# Patient Record
Sex: MALE | Race: OTHER | NOT HISPANIC OR LATINO
[De-identification: names, ages, dates, MRNs, and addresses within clinical notes are randomized per-mention and may not be internally consistent; named-entity substitution may affect disease eponyms.]

---

## 2017-01-19 ENCOUNTER — NON-APPOINTMENT (OUTPATIENT)
Age: 82
End: 2017-01-19

## 2017-01-19 ENCOUNTER — APPOINTMENT (OUTPATIENT)
Dept: ELECTROPHYSIOLOGY | Facility: CLINIC | Age: 82
End: 2017-01-19

## 2017-01-19 VITALS
HEIGHT: 62 IN | HEART RATE: 141 BPM | BODY MASS INDEX: 21.9 KG/M2 | SYSTOLIC BLOOD PRESSURE: 174 MMHG | DIASTOLIC BLOOD PRESSURE: 122 MMHG | WEIGHT: 119 LBS | RESPIRATION RATE: 14 BRPM | OXYGEN SATURATION: 100 %

## 2017-01-19 DIAGNOSIS — I10 ESSENTIAL (PRIMARY) HYPERTENSION: ICD-10-CM

## 2017-01-26 ENCOUNTER — APPOINTMENT (OUTPATIENT)
Dept: ELECTROPHYSIOLOGY | Facility: CLINIC | Age: 82
End: 2017-01-26

## 2017-01-26 VITALS — SYSTOLIC BLOOD PRESSURE: 179 MMHG | DIASTOLIC BLOOD PRESSURE: 78 MMHG | HEART RATE: 67 BPM

## 2017-01-26 VITALS — WEIGHT: 119 LBS | BODY MASS INDEX: 21.9 KG/M2 | HEIGHT: 62 IN

## 2017-01-26 DIAGNOSIS — I35.0 NONRHEUMATIC AORTIC (VALVE) STENOSIS: ICD-10-CM

## 2017-01-27 PROBLEM — I35.0 NONRHEUMATIC AORTIC VALVE STENOSIS: Status: ACTIVE | Noted: 2017-01-27

## 2017-01-27 RX ORDER — METOPROLOL TARTRATE 75 MG/1
75 TABLET, FILM COATED ORAL
Refills: 0 | Status: ACTIVE | COMMUNITY
Start: 2017-01-27

## 2017-01-27 RX ORDER — DILTIAZEM HYDROCHLORIDE 300 MG/1
300 CAPSULE, EXTENDED RELEASE ORAL
Refills: 0 | Status: ACTIVE | COMMUNITY
Start: 2017-01-27

## 2017-01-30 ENCOUNTER — APPOINTMENT (OUTPATIENT)
Dept: ENDOCRINOLOGY | Facility: CLINIC | Age: 82
End: 2017-01-30

## 2017-01-30 VITALS
DIASTOLIC BLOOD PRESSURE: 90 MMHG | HEART RATE: 72 BPM | SYSTOLIC BLOOD PRESSURE: 150 MMHG | BODY MASS INDEX: 22.08 KG/M2 | HEIGHT: 62 IN | WEIGHT: 120 LBS | OXYGEN SATURATION: 94 %

## 2017-01-30 DIAGNOSIS — E11.9 TYPE 2 DIABETES MELLITUS W/OUT COMPLICATIONS: ICD-10-CM

## 2017-01-30 DIAGNOSIS — R94.6 ABNORMAL RESULTS OF THYROID FUNCTION STUDIES: ICD-10-CM

## 2017-01-30 LAB
GLUCOSE BLDC GLUCOMTR-MCNC: 208
HBA1C MFR BLD HPLC: 6.9

## 2017-01-30 RX ORDER — WARFARIN SODIUM 10 MG/1
10 TABLET ORAL
Qty: 30 | Refills: 0 | Status: ACTIVE | COMMUNITY
Start: 2016-11-17

## 2017-01-30 RX ORDER — LANCETS 33 GAUGE
EACH MISCELLANEOUS
Qty: 1 | Refills: 3 | Status: ACTIVE | COMMUNITY
Start: 2017-01-30

## 2017-01-30 RX ORDER — BLOOD SUGAR DIAGNOSTIC
STRIP MISCELLANEOUS
Qty: 1 | Refills: 1 | Status: ACTIVE | COMMUNITY
Start: 2017-01-30

## 2017-03-27 ENCOUNTER — APPOINTMENT (OUTPATIENT)
Dept: ELECTROPHYSIOLOGY | Facility: CLINIC | Age: 82
End: 2017-03-27

## 2017-03-27 DIAGNOSIS — R00.2 PALPITATIONS: ICD-10-CM

## 2017-04-06 ENCOUNTER — APPOINTMENT (OUTPATIENT)
Dept: ENDOCRINOLOGY | Facility: CLINIC | Age: 82
End: 2017-04-06

## 2017-06-29 ENCOUNTER — APPOINTMENT (OUTPATIENT)
Dept: ELECTROPHYSIOLOGY | Facility: CLINIC | Age: 82
End: 2017-06-29

## 2017-10-05 ENCOUNTER — APPOINTMENT (OUTPATIENT)
Dept: ELECTROPHYSIOLOGY | Facility: CLINIC | Age: 82
End: 2017-10-05
Payer: MEDICARE

## 2017-10-05 DIAGNOSIS — I48.0 PAROXYSMAL ATRIAL FIBRILLATION: ICD-10-CM

## 2017-10-05 PROCEDURE — 93296 REM INTERROG EVL PM/IDS: CPT

## 2017-10-05 PROCEDURE — 93294 REM INTERROG EVL PM/LDLS PM: CPT

## 2018-04-26 ENCOUNTER — APPOINTMENT (OUTPATIENT)
Dept: ELECTROPHYSIOLOGY | Facility: CLINIC | Age: 83
End: 2018-04-26

## 2019-11-29 ENCOUNTER — APPOINTMENT (OUTPATIENT)
Dept: ELECTROPHYSIOLOGY | Facility: CLINIC | Age: 84
End: 2019-11-29
Payer: MEDICARE

## 2019-11-29 PROCEDURE — 93296 REM INTERROG EVL PM/IDS: CPT

## 2019-11-29 PROCEDURE — 93294 REM INTERROG EVL PM/LDLS PM: CPT

## 2019-12-14 ENCOUNTER — EMERGENCY (EMERGENCY)
Facility: HOSPITAL | Age: 84
LOS: 0 days | Discharge: LEFT BEFORE DISCHARGE | End: 2019-12-14
Attending: EMERGENCY MEDICINE
Payer: MEDICARE

## 2019-12-14 VITALS
SYSTOLIC BLOOD PRESSURE: 135 MMHG | DIASTOLIC BLOOD PRESSURE: 75 MMHG | HEART RATE: 80 BPM | TEMPERATURE: 98 F | OXYGEN SATURATION: 100 % | RESPIRATION RATE: 18 BRPM

## 2019-12-14 VITALS
HEART RATE: 87 BPM | WEIGHT: 108.91 LBS | RESPIRATION RATE: 16 BRPM | TEMPERATURE: 98 F | OXYGEN SATURATION: 98 % | DIASTOLIC BLOOD PRESSURE: 77 MMHG | HEIGHT: 61 IN | SYSTOLIC BLOOD PRESSURE: 130 MMHG

## 2019-12-14 DIAGNOSIS — Z98.49 CATARACT EXTRACTION STATUS, UNSPECIFIED EYE: Chronic | ICD-10-CM

## 2019-12-14 DIAGNOSIS — Z95.0 PRESENCE OF CARDIAC PACEMAKER: Chronic | ICD-10-CM

## 2019-12-14 DIAGNOSIS — R53.1 WEAKNESS: ICD-10-CM

## 2019-12-14 DIAGNOSIS — I63.9 CEREBRAL INFARCTION, UNSPECIFIED: ICD-10-CM

## 2019-12-14 LAB
ALBUMIN SERPL ELPH-MCNC: 3.7 G/DL — SIGNIFICANT CHANGE UP (ref 3.3–5)
ALP SERPL-CCNC: 58 U/L — SIGNIFICANT CHANGE UP (ref 40–120)
ALT FLD-CCNC: 28 U/L — SIGNIFICANT CHANGE UP (ref 12–78)
ANION GAP SERPL CALC-SCNC: 7 MMOL/L — SIGNIFICANT CHANGE UP (ref 5–17)
APTT BLD: 35.7 SEC — SIGNIFICANT CHANGE UP (ref 28.5–37)
AST SERPL-CCNC: 20 U/L — SIGNIFICANT CHANGE UP (ref 15–37)
BASOPHILS # BLD AUTO: 0.09 K/UL — SIGNIFICANT CHANGE UP (ref 0–0.2)
BASOPHILS NFR BLD AUTO: 0.7 % — SIGNIFICANT CHANGE UP (ref 0–2)
BILIRUB SERPL-MCNC: 0.4 MG/DL — SIGNIFICANT CHANGE UP (ref 0.2–1.2)
BLD GP AB SCN SERPL QL: SIGNIFICANT CHANGE UP
BUN SERPL-MCNC: 32 MG/DL — HIGH (ref 7–23)
CALCIUM SERPL-MCNC: 8.7 MG/DL — SIGNIFICANT CHANGE UP (ref 8.5–10.1)
CHLORIDE SERPL-SCNC: 96 MMOL/L — SIGNIFICANT CHANGE UP (ref 96–108)
CK MB CFR SERPL CALC: 3.5 NG/ML — SIGNIFICANT CHANGE UP (ref 0.5–3.6)
CO2 SERPL-SCNC: 29 MMOL/L — SIGNIFICANT CHANGE UP (ref 22–31)
CREAT SERPL-MCNC: 1.58 MG/DL — HIGH (ref 0.5–1.3)
EOSINOPHIL # BLD AUTO: 0.51 K/UL — HIGH (ref 0–0.5)
EOSINOPHIL NFR BLD AUTO: 4.2 % — SIGNIFICANT CHANGE UP (ref 0–6)
GLUCOSE BLDC GLUCOMTR-MCNC: 214 MG/DL — HIGH (ref 70–99)
GLUCOSE SERPL-MCNC: 215 MG/DL — HIGH (ref 70–99)
HCT VFR BLD CALC: 36 % — LOW (ref 39–50)
HGB BLD-MCNC: 12.3 G/DL — LOW (ref 13–17)
IMM GRANULOCYTES NFR BLD AUTO: 0.9 % — SIGNIFICANT CHANGE UP (ref 0–1.5)
INR BLD: 1.83 RATIO — HIGH (ref 0.88–1.16)
LYMPHOCYTES # BLD AUTO: 2.67 K/UL — SIGNIFICANT CHANGE UP (ref 1–3.3)
LYMPHOCYTES # BLD AUTO: 21.8 % — SIGNIFICANT CHANGE UP (ref 13–44)
MAGNESIUM SERPL-MCNC: 2.1 MG/DL — SIGNIFICANT CHANGE UP (ref 1.6–2.6)
MCHC RBC-ENTMCNC: 31.1 PG — SIGNIFICANT CHANGE UP (ref 27–34)
MCHC RBC-ENTMCNC: 34.2 GM/DL — SIGNIFICANT CHANGE UP (ref 32–36)
MCV RBC AUTO: 91.1 FL — SIGNIFICANT CHANGE UP (ref 80–100)
MONOCYTES # BLD AUTO: 1.15 K/UL — HIGH (ref 0–0.9)
MONOCYTES NFR BLD AUTO: 9.4 % — SIGNIFICANT CHANGE UP (ref 2–14)
NEUTROPHILS # BLD AUTO: 7.72 K/UL — HIGH (ref 1.8–7.4)
NEUTROPHILS NFR BLD AUTO: 63 % — SIGNIFICANT CHANGE UP (ref 43–77)
NRBC # BLD: 0 /100 WBCS — SIGNIFICANT CHANGE UP (ref 0–0)
NT-PROBNP SERPL-SCNC: 4606 PG/ML — HIGH (ref 0–450)
PLATELET # BLD AUTO: 204 K/UL — SIGNIFICANT CHANGE UP (ref 150–400)
POTASSIUM SERPL-MCNC: 4.2 MMOL/L — SIGNIFICANT CHANGE UP (ref 3.5–5.3)
POTASSIUM SERPL-SCNC: 4.2 MMOL/L — SIGNIFICANT CHANGE UP (ref 3.5–5.3)
PROT SERPL-MCNC: 7.5 GM/DL — SIGNIFICANT CHANGE UP (ref 6–8.3)
PROTHROM AB SERPL-ACNC: 20.9 SEC — HIGH (ref 10–12.9)
RBC # BLD: 3.95 M/UL — LOW (ref 4.2–5.8)
RBC # FLD: 12.2 % — SIGNIFICANT CHANGE UP (ref 10.3–14.5)
SODIUM SERPL-SCNC: 132 MMOL/L — LOW (ref 135–145)
TROPONIN I SERPL-MCNC: <.015 NG/ML — SIGNIFICANT CHANGE UP (ref 0.01–0.04)
TSH SERPL-MCNC: 3.91 UU/ML — HIGH (ref 0.36–3.74)
WBC # BLD: 12.25 K/UL — HIGH (ref 3.8–10.5)
WBC # FLD AUTO: 12.25 K/UL — HIGH (ref 3.8–10.5)

## 2019-12-14 PROCEDURE — 93010 ELECTROCARDIOGRAM REPORT: CPT

## 2019-12-14 PROCEDURE — 99284 EMERGENCY DEPT VISIT MOD MDM: CPT

## 2019-12-14 PROCEDURE — 70450 CT HEAD/BRAIN W/O DYE: CPT | Mod: 26

## 2019-12-14 RX ORDER — ASPIRIN/CALCIUM CARB/MAGNESIUM 324 MG
325 TABLET ORAL ONCE
Refills: 0 | Status: COMPLETED | OUTPATIENT
Start: 2019-12-14 | End: 2019-12-14

## 2019-12-14 RX ADMIN — Medication 325 MILLIGRAM(S): at 11:53

## 2019-12-14 NOTE — ED PROVIDER NOTE - NSFOLLOWUPINSTRUCTIONS_ED_ALL_ED_FT
You are leaving the hospital against medical advise after having had a stroke.     Please follow up with neurology or an emergency room as soon as possible.

## 2019-12-14 NOTE — ED ADULT TRIAGE NOTE - CHIEF COMPLAINT QUOTE
since about 1030  noted left facial droop possible onset last night family noticed at 1030 left side completely immobile Dr Coates evaluated and cleared for CT out of window

## 2019-12-14 NOTE — ED ADULT NURSE NOTE - OBJECTIVE STATEMENT
received pt to bed 8 awake alert and oriented to person and place. noted with left side facial droop, weakness and decreased sensation to left side of body. unknown last normal. possible before bed last night. pt was sent to CT stat. labs obtained. pt place on  Cm. afib on monitor. family at bedside. requesting pt be transferred to [place]. dr mccullough made aware. plan of car explained to pt 's daughter who verbalized understanding.

## 2019-12-14 NOTE — ED PROVIDER NOTE - CLINICAL SUMMARY MEDICAL DECISION MAKING FREE TEXT BOX
pt pw cva. not tpa candidate based on use of coumadin. will treat medically. I read ekg as afib rate 92, st depressions laterally, no st elevation, normal qtc, narrow qrs. pt pw cva. not tpa candidate based on use of coumadin. will treat medically. I read ekg as afib rate 92, st depressions laterally, no st elevation, normal qtc, narrow qrs. ct head no acute process. I offered and attempted to admit the patient and even discussed case with neuro dr cervantes, however pt and fam insist on leaving [facility]. they want to follow up with their primary care doctor. I explained risk of leaving [facility] including another stroke and death. pt and fam understand this and want to accept risk. father may or may not have full capacity (oriented x3 but cognitive slowing) but his son and daughter do. will honor their decision.

## 2019-12-14 NOTE — ED PROVIDER NOTE - PHYSICAL EXAMINATION
Gen: Alert, NAD  Head: NC, AT   Eyes: PERRL, EOMI, normal lids/conjunctiva  ENT: normal hearing, patent oropharynx without erythema/exudate, uvula midline  Neck: supple, no tenderness, Trachea midline  Pulm: Bilateral BS, normal resp effort, no wheeze/stridor/retractions  CV: irregular, left chest pacemaker  Abd: soft, NT/ND, +BS, no hepatosplenomegaly  Mskel: extremities x4 with normal ROM and no joint effusions. no ctl spine ttp.   Skin: no rash, no bruising   Neuro: AAOx3 but some cognitive slowing, left side weakness 4/5. right side 5/5. finger to nose intact. decrease sensation lle.

## 2019-12-14 NOTE — ED PROVIDER NOTE - PATIENT PORTAL LINK FT
You can access the FollowMyHealth Patient Portal offered by [facility] by registering at the following website: [email or web address]. By joining [employer]’s FollowMyHealth portal, you will also be able to view your health information using other applications (apps) compatible with our system.

## 2019-12-14 NOTE — ED PROVIDER NOTE - OBJECTIVE STATEMENT
Pertinent PMH/PSH/FHx/SHx and Review of Systems contained within:  89M hx of afib on coumadin and htn pw weakness. at approx 1010am pt was known to be well and in usoh. shortly thereafter, perhaps 1020am pt started drooling and was found to be weak. he doesn't complain of anything in particular. denies ha, vision loss, cp, sob, nausea, vomiting, fever, chills, rash, bleeding, rhinorrhea, dysuria. brought over by ems  Fh and Sh not otherwise contributory  ROS otherwise negative

## 2019-12-14 NOTE — ED ADULT NURSE NOTE - NSIMPLEMENTINTERV_GEN_ALL_ED
Implemented All Fall with Harm Risk Interventions:  [place] to call system. Call bell, personal items and telephone within reach. Instruct patient to call for assistance. Room bathroom lighting operational. Non-slip footwear when patient is off stretcher. Physically safe environment: no spills, clutter or unnecessary equipment. Stretcher in lowest position, wheels locked, appropriate side rails in place. Provide visual cue, wrist band, yellow gown, etc. Monitor gait and stability. Monitor for mental status changes and reorient to person, place, and time. Review medications for side effects contributing to fall risk. Reinforce activity limits and safety measures with patient and family. Provide visual clues: red socks.

## 2019-12-14 NOTE — ED PROVIDER NOTE - CARE PROVIDER_API CALL
Juanita Syed (DO)  Neurology  [address] [place] [address]  Phone: [phone]  Fax: [phone]  Follow Up Time:

## 2019-12-14 NOTE — ED ADULT NURSE REASSESSMENT NOTE - NS ED NURSE REASSESS COMMENT FT1
pt discharged AMA. family wants pt treated at [place]. vital signs stable. heplock discontinued. discharge instructions explained to daughter who verbalized understanding. pt and daughter escorted to waiting area.

## 2020-02-18 PROBLEM — Z00.00 ENCOUNTER FOR PREVENTIVE HEALTH EXAMINATION: Noted: 2020-02-18

## 2020-02-20 ENCOUNTER — INPATIENT (INPATIENT)
Facility: HOSPITAL | Age: 85
LOS: 2 days | Discharge: HOME HEALTH | End: 2020-02-23
Attending: INTERNAL MEDICINE | Admitting: INTERNAL MEDICINE
Payer: MEDICARE

## 2020-02-20 VITALS
HEIGHT: 60 IN | DIASTOLIC BLOOD PRESSURE: 56 MMHG | RESPIRATION RATE: 18 BRPM | TEMPERATURE: 98 F | HEART RATE: 60 BPM | OXYGEN SATURATION: 99 % | SYSTOLIC BLOOD PRESSURE: 144 MMHG | WEIGHT: 113.1 LBS

## 2020-02-20 DIAGNOSIS — I10 ESSENTIAL (PRIMARY) HYPERTENSION: ICD-10-CM

## 2020-02-20 DIAGNOSIS — Z95.0 PRESENCE OF CARDIAC PACEMAKER: Chronic | ICD-10-CM

## 2020-02-20 DIAGNOSIS — I63.9 CEREBRAL INFARCTION, UNSPECIFIED: ICD-10-CM

## 2020-02-20 DIAGNOSIS — Z29.9 ENCOUNTER FOR PROPHYLACTIC MEASURES, UNSPECIFIED: ICD-10-CM

## 2020-02-20 DIAGNOSIS — E11.9 TYPE 2 DIABETES MELLITUS WITHOUT COMPLICATIONS: ICD-10-CM

## 2020-02-20 DIAGNOSIS — I48.91 UNSPECIFIED ATRIAL FIBRILLATION: ICD-10-CM

## 2020-02-20 DIAGNOSIS — Z98.49 CATARACT EXTRACTION STATUS, UNSPECIFIED EYE: Chronic | ICD-10-CM

## 2020-02-20 LAB
ALBUMIN SERPL ELPH-MCNC: 2.9 G/DL — LOW (ref 3.3–5)
ALP SERPL-CCNC: 65 U/L — SIGNIFICANT CHANGE UP (ref 40–120)
ALT FLD-CCNC: 28 U/L — SIGNIFICANT CHANGE UP (ref 12–78)
ANION GAP SERPL CALC-SCNC: 7 MMOL/L — SIGNIFICANT CHANGE UP (ref 5–17)
APPEARANCE UR: CLEAR — SIGNIFICANT CHANGE UP
APTT BLD: 34.1 SEC — SIGNIFICANT CHANGE UP (ref 28.5–37)
AST SERPL-CCNC: 18 U/L — SIGNIFICANT CHANGE UP (ref 15–37)
BASOPHILS # BLD AUTO: 0.05 K/UL — SIGNIFICANT CHANGE UP (ref 0–0.2)
BASOPHILS NFR BLD AUTO: 0.5 % — SIGNIFICANT CHANGE UP (ref 0–2)
BILIRUB SERPL-MCNC: 0.5 MG/DL — SIGNIFICANT CHANGE UP (ref 0.2–1.2)
BILIRUB UR-MCNC: NEGATIVE — SIGNIFICANT CHANGE UP
BLD GP AB SCN SERPL QL: SIGNIFICANT CHANGE UP
BUN SERPL-MCNC: 37 MG/DL — HIGH (ref 7–23)
CALCIUM SERPL-MCNC: 8.1 MG/DL — LOW (ref 8.5–10.1)
CHLORIDE SERPL-SCNC: 102 MMOL/L — SIGNIFICANT CHANGE UP (ref 96–108)
CO2 SERPL-SCNC: 27 MMOL/L — SIGNIFICANT CHANGE UP (ref 22–31)
COLOR SPEC: YELLOW — SIGNIFICANT CHANGE UP
CREAT SERPL-MCNC: 1.67 MG/DL — HIGH (ref 0.5–1.3)
DIFF PNL FLD: ABNORMAL
EOSINOPHIL # BLD AUTO: 0.18 K/UL — SIGNIFICANT CHANGE UP (ref 0–0.5)
EOSINOPHIL NFR BLD AUTO: 1.8 % — SIGNIFICANT CHANGE UP (ref 0–6)
EPI CELLS # UR: SIGNIFICANT CHANGE UP
GLUCOSE BLDC GLUCOMTR-MCNC: 185 MG/DL — HIGH (ref 70–99)
GLUCOSE BLDC GLUCOMTR-MCNC: 223 MG/DL — HIGH (ref 70–99)
GLUCOSE BLDC GLUCOMTR-MCNC: 299 MG/DL — HIGH (ref 70–99)
GLUCOSE SERPL-MCNC: 278 MG/DL — HIGH (ref 70–99)
GLUCOSE UR QL: 100 MG/DL
HCT VFR BLD CALC: 32.5 % — LOW (ref 39–50)
HGB BLD-MCNC: 11 G/DL — LOW (ref 13–17)
IMM GRANULOCYTES NFR BLD AUTO: 1 % — SIGNIFICANT CHANGE UP (ref 0–1.5)
INR BLD: 1.39 RATIO — HIGH (ref 0.88–1.16)
KETONES UR-MCNC: NEGATIVE — SIGNIFICANT CHANGE UP
LEUKOCYTE ESTERASE UR-ACNC: NEGATIVE — SIGNIFICANT CHANGE UP
LYMPHOCYTES # BLD AUTO: 2.65 K/UL — SIGNIFICANT CHANGE UP (ref 1–3.3)
LYMPHOCYTES # BLD AUTO: 26.7 % — SIGNIFICANT CHANGE UP (ref 13–44)
MCHC RBC-ENTMCNC: 31.3 PG — SIGNIFICANT CHANGE UP (ref 27–34)
MCHC RBC-ENTMCNC: 33.8 GM/DL — SIGNIFICANT CHANGE UP (ref 32–36)
MCV RBC AUTO: 92.3 FL — SIGNIFICANT CHANGE UP (ref 80–100)
MONOCYTES # BLD AUTO: 1.15 K/UL — HIGH (ref 0–0.9)
MONOCYTES NFR BLD AUTO: 11.6 % — SIGNIFICANT CHANGE UP (ref 2–14)
NEUTROPHILS # BLD AUTO: 5.81 K/UL — SIGNIFICANT CHANGE UP (ref 1.8–7.4)
NEUTROPHILS NFR BLD AUTO: 58.4 % — SIGNIFICANT CHANGE UP (ref 43–77)
NITRITE UR-MCNC: NEGATIVE — SIGNIFICANT CHANGE UP
NRBC # BLD: 0 /100 WBCS — SIGNIFICANT CHANGE UP (ref 0–0)
PH UR: 6 — SIGNIFICANT CHANGE UP (ref 5–8)
PLATELET # BLD AUTO: 191 K/UL — SIGNIFICANT CHANGE UP (ref 150–400)
POTASSIUM SERPL-MCNC: 4.2 MMOL/L — SIGNIFICANT CHANGE UP (ref 3.5–5.3)
POTASSIUM SERPL-SCNC: 4.2 MMOL/L — SIGNIFICANT CHANGE UP (ref 3.5–5.3)
PROT SERPL-MCNC: 6.6 GM/DL — SIGNIFICANT CHANGE UP (ref 6–8.3)
PROT UR-MCNC: 100 MG/DL
PROTHROM AB SERPL-ACNC: 15.7 SEC — HIGH (ref 10–12.9)
RBC # BLD: 3.52 M/UL — LOW (ref 4.2–5.8)
RBC # FLD: 12.7 % — SIGNIFICANT CHANGE UP (ref 10.3–14.5)
RBC CASTS # UR COMP ASSIST: ABNORMAL /HPF (ref 0–4)
SODIUM SERPL-SCNC: 136 MMOL/L — SIGNIFICANT CHANGE UP (ref 135–145)
SP GR SPEC: 1.01 — SIGNIFICANT CHANGE UP (ref 1.01–1.02)
TROPONIN I SERPL-MCNC: 0.04 NG/ML — SIGNIFICANT CHANGE UP (ref 0.01–0.04)
UROBILINOGEN FLD QL: NEGATIVE MG/DL — SIGNIFICANT CHANGE UP
WBC # BLD: 9.94 K/UL — SIGNIFICANT CHANGE UP (ref 3.8–10.5)
WBC # FLD AUTO: 9.94 K/UL — SIGNIFICANT CHANGE UP (ref 3.8–10.5)

## 2020-02-20 PROCEDURE — 93010 ELECTROCARDIOGRAM REPORT: CPT

## 2020-02-20 PROCEDURE — 99291 CRITICAL CARE FIRST HOUR: CPT

## 2020-02-20 PROCEDURE — 70496 CT ANGIOGRAPHY HEAD: CPT | Mod: 26

## 2020-02-20 PROCEDURE — 70450 CT HEAD/BRAIN W/O DYE: CPT | Mod: 26,59

## 2020-02-20 PROCEDURE — 99223 1ST HOSP IP/OBS HIGH 75: CPT

## 2020-02-20 PROCEDURE — 70498 CT ANGIOGRAPHY NECK: CPT | Mod: 26

## 2020-02-20 RX ORDER — ATORVASTATIN CALCIUM 80 MG/1
40 TABLET, FILM COATED ORAL DAILY
Refills: 0 | Status: DISCONTINUED | OUTPATIENT
Start: 2020-02-20 | End: 2020-02-20

## 2020-02-20 RX ORDER — SODIUM CHLORIDE 9 MG/ML
1000 INJECTION, SOLUTION INTRAVENOUS
Refills: 0 | Status: DISCONTINUED | OUTPATIENT
Start: 2020-02-20 | End: 2020-02-23

## 2020-02-20 RX ORDER — INSULIN LISPRO 100/ML
VIAL (ML) SUBCUTANEOUS
Refills: 0 | Status: DISCONTINUED | OUTPATIENT
Start: 2020-02-20 | End: 2020-02-23

## 2020-02-20 RX ORDER — DEXTROSE 50 % IN WATER 50 %
15 SYRINGE (ML) INTRAVENOUS ONCE
Refills: 0 | Status: DISCONTINUED | OUTPATIENT
Start: 2020-02-20 | End: 2020-02-23

## 2020-02-20 RX ORDER — DEXTROSE 50 % IN WATER 50 %
25 SYRINGE (ML) INTRAVENOUS ONCE
Refills: 0 | Status: DISCONTINUED | OUTPATIENT
Start: 2020-02-20 | End: 2020-02-20

## 2020-02-20 RX ORDER — DEXTROSE 50 % IN WATER 50 %
12.5 SYRINGE (ML) INTRAVENOUS ONCE
Refills: 0 | Status: DISCONTINUED | OUTPATIENT
Start: 2020-02-20 | End: 2020-02-20

## 2020-02-20 RX ORDER — INSULIN LISPRO 100/ML
VIAL (ML) SUBCUTANEOUS AT BEDTIME
Refills: 0 | Status: DISCONTINUED | OUTPATIENT
Start: 2020-02-20 | End: 2020-02-23

## 2020-02-20 RX ORDER — ATORVASTATIN CALCIUM 80 MG/1
40 TABLET, FILM COATED ORAL AT BEDTIME
Refills: 0 | Status: DISCONTINUED | OUTPATIENT
Start: 2020-02-20 | End: 2020-02-21

## 2020-02-20 RX ORDER — SODIUM CHLORIDE 9 MG/ML
250 INJECTION INTRAMUSCULAR; INTRAVENOUS; SUBCUTANEOUS ONCE
Refills: 0 | Status: COMPLETED | OUTPATIENT
Start: 2020-02-20 | End: 2020-02-20

## 2020-02-20 RX ORDER — SODIUM CHLORIDE 9 MG/ML
1000 INJECTION INTRAMUSCULAR; INTRAVENOUS; SUBCUTANEOUS ONCE
Refills: 0 | Status: COMPLETED | OUTPATIENT
Start: 2020-02-20 | End: 2020-02-20

## 2020-02-20 RX ORDER — DEXTROSE 50 % IN WATER 50 %
12.5 SYRINGE (ML) INTRAVENOUS ONCE
Refills: 0 | Status: DISCONTINUED | OUTPATIENT
Start: 2020-02-20 | End: 2020-02-23

## 2020-02-20 RX ORDER — INSULIN LISPRO 100/ML
VIAL (ML) SUBCUTANEOUS EVERY 6 HOURS
Refills: 0 | Status: DISCONTINUED | OUTPATIENT
Start: 2020-02-20 | End: 2020-02-20

## 2020-02-20 RX ORDER — GLUCAGON INJECTION, SOLUTION 0.5 MG/.1ML
1 INJECTION, SOLUTION SUBCUTANEOUS ONCE
Refills: 0 | Status: DISCONTINUED | OUTPATIENT
Start: 2020-02-20 | End: 2020-02-23

## 2020-02-20 RX ORDER — APIXABAN 2.5 MG/1
2.5 TABLET, FILM COATED ORAL
Refills: 0 | Status: DISCONTINUED | OUTPATIENT
Start: 2020-02-20 | End: 2020-02-20

## 2020-02-20 RX ORDER — DEXTROSE 50 % IN WATER 50 %
15 SYRINGE (ML) INTRAVENOUS ONCE
Refills: 0 | Status: DISCONTINUED | OUTPATIENT
Start: 2020-02-20 | End: 2020-02-20

## 2020-02-20 RX ORDER — CHLORHEXIDINE GLUCONATE 213 G/1000ML
1 SOLUTION TOPICAL
Refills: 0 | Status: DISCONTINUED | OUTPATIENT
Start: 2020-02-20 | End: 2020-02-22

## 2020-02-20 RX ORDER — INSULIN LISPRO 100/ML
VIAL (ML) SUBCUTANEOUS
Refills: 0 | Status: DISCONTINUED | OUTPATIENT
Start: 2020-02-20 | End: 2020-02-20

## 2020-02-20 RX ADMIN — ATORVASTATIN CALCIUM 40 MILLIGRAM(S): 80 TABLET, FILM COATED ORAL at 22:01

## 2020-02-20 RX ADMIN — SODIUM CHLORIDE 1000 MILLILITER(S): 9 INJECTION INTRAMUSCULAR; INTRAVENOUS; SUBCUTANEOUS at 12:31

## 2020-02-20 RX ADMIN — Medication 2: at 17:06

## 2020-02-20 RX ADMIN — SODIUM CHLORIDE 250 MILLILITER(S): 9 INJECTION INTRAMUSCULAR; INTRAVENOUS; SUBCUTANEOUS at 12:31
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
## 2020-02-20 NOTE — SWALLOW BEDSIDE ASSESSMENT ADULT - SLP GENERAL OBSERVATIONS
pt. seen bedside alert and oriented x4. pt. responded to simple autobiographical/orientation questions and followed one step directions. noted good speech intelligibility with low vocal volume. pt. seen bedside alert and oriented x4. pt. responded to simple autobiographical/orientation questions and followed one step directions. noted good speech intelligibility with low vocal volume. wife/daughter/son present.

## 2020-02-20 NOTE — SWALLOW BEDSIDE ASSESSMENT ADULT - ASR SWALLOW DENTITION
daughter noted pt. had temporary crown on upper tooth and had a tooth pulled on the right side./present and adequate

## 2020-02-20 NOTE — OCCUPATIONAL THERAPY INITIAL EVALUATION ADULT - FINE MOTOR COORDINATION TRAINING, OT EVAL
Pt will increase coordination to WFL as evidenced by manipulation of small object 5/5 accuracy in 2 weeks.

## 2020-02-20 NOTE — SWALLOW BEDSIDE ASSESSMENT ADULT - SWALLOW EVAL: RECOMMENDED FEEDING/EATING TECHNIQUES
check mouth frequently for oral residue/pocketing/maintain upright posture during/after eating for 30 mins/small sips/bites

## 2020-02-20 NOTE — CONSULT NOTE ADULT - SUBJECTIVE AND OBJECTIVE BOX
HPI: 89 year old man with hx of TIA (12/2019), A-fib, DM2, and HTN presenting with sudden left sided weakness at 9:45am today. He woke up well at 8:30am and took all his medications by 9am with his breakfast. He had similar symptoms in the past in 12/2019 and had work up at [facility]. He was switched from Coumadin to Eliquis for A-fib and started on Lipitor 40mg nightly. Patient ambulates without assistance and has intact mentation at baseline. CT head was unremarkable for acute process. CTA head/neck showed right M1 occlusion. As per Tele-neuro, not an interventional candidate.     PMHx: TIA (12/2019), A-fib, HTN, HLD, DM2  PSHx: pacemaker  Allergies: Tetracycline  Social Hx: non-smoker, no etoh, no illicit drug use  ROS: Left sided weakness  Meds: See EMR  FHx: None    Vitals: Temp 97.0F   RR 15   HR 60   /67  General: NAD  Neuro: AOx3. No dysarthria. No aphasia. EOM intact. Denies diplopia but keeps closing one eye. VFF. Subtle left facial droop. Mild left arm and leg drift. Mild sensory neglect on left. Reflexes diminished and toes down. Tongue is midline. Palate elevates symmetrically. Shoulder shrug is intact. Gait exam deferred.     CT head, CTA head/neck, and labs reviewed.

## 2020-02-20 NOTE — H&P ADULT - NSHPLABSRESULTS_GEN_ALL_CORE
11.0   9.94  )-----------( 191      ( 20 Feb 2020 11:27 )             32.5   02-20    136  |  102  |  37<H>  ----------------------------<  278<H>  4.2   |  27  |  1.67<H>    Ca    8.1<L>      20 Feb 2020 11:27    TPro  6.6  /  Alb  2.9<L>  /  TBili  0.5  /  DBili  x   /  AST  18  /  ALT  28  /  AlkPhos  65  02-20        < from: CT Angio Head w/ IV Cont (02.20.20 @ 11:01) >    IMPRESSION:    CTA neck:  1. Atherosclerosis of the bilateral carotid bifurcations without flow limiting stenosis of the left internal carotid artery origin. There is, however, 75-85% stenosis of the right internal carotid artery origin by NASCET criteria.  2. Patent vertebral arteries.    CTA head:  1. Abrupt occlusion of the proximal right M1 segment. There is, however, contrast enhancement of the distal right MCA branches from collateral flow.  2. Otherwise unremarkable examination.    < end of copied text >

## 2020-02-20 NOTE — CONSULT NOTE ADULT - ASSESSMENT
Acute right MCA stroke  Right ICA stenosis  TIA (12/2019)  A-fib  HTN  DM2    - as per Dr. Libman (Tele-neuro) not an interventional neuro candidate  - Repeat CT head tomorrow  - continue Lipitor for secondary stroke prevention  - Hold Eliquis until after the CT head done.  - Maintain SBP between 200-140  - Echo pending  - PT/OT  - discussed with family at bedside and answered all their questions.     Thank you for the consult.

## 2020-02-20 NOTE — ED PROVIDER NOTE - OBJECTIVE STATEMENT
89 years old male by ems with daughter c/o sudden onset of slurred speech, left side weakness and left facial droop at 9:30 am EMS sts pt 's wife sts pt has was normal at breakfast this morning. Pt's daughter sts pt takes Eliquis for hx of atrial fib. Pt is alert and oriented x 3 denies headache, dizziness, blurred visions, light sensitivities, cough, neck/back pain, recent hx of trauma, traveling, sob, chest pain, nausea, vomiting, fever, chills, abd pain, dysuria or irregular bowel movements. At triage pt speech and left side weakness resolved. 89 years old male by ems with daughter c/o sudden onset of slurred speech, left side weakness and left facial droop at 9:30 am EMS sts pt 's wife sts pt has was normal at breakfast this morning. Pt's daughter sts pt takes Eliquis for hx of atrial fib. Pt is alert and oriented x 3 denies headache, dizziness, blurred visions, light sensitivities, cough, neck/back pain, recent hx of trauma, traveling, sob, chest pain, nausea, vomiting, fever, chills, abd pain, dysuria or irregular bowel movements. At triage pt speech and left side weakness resolved. Daughter sts pt had last does of Eliquis was at 9:00 am this morning.

## 2020-02-20 NOTE — ED ADULT NURSE NOTE - OBJECTIVE STATEMENT
late entrance patient A&Ox3 in no acute distress in arrival patient here as a code stroke , as per wife Josefa , patient started c/o of blurred vision and facial droop around 9:30 this morning , patient moving all extremities at the time of arrival , no facial droop clear speech , patient mouth was full of food on arrival ,nurse clear the mouth denied chest pain, denied difficulty breathing no headache denied N/V denied blurred vision at the time , family aware patient can not have food or water , nothing by mouth

## 2020-02-20 NOTE — H&P ADULT - NSHPPHYSICALEXAM_GEN_ALL_CORE
ICU Vital Signs Last 24 Hrs  T(C): 36.7 (20 Feb 2020 10:33), Max: 36.7 (20 Feb 2020 10:33)  T(F): 98 (20 Feb 2020 10:33), Max: 98 (20 Feb 2020 10:33)  HR: 68 (20 Feb 2020 11:28) (60 - 68)  BP: 153/63 (20 Feb 2020 11:28) (144/56 - 153/63)  RR: 16 (20 Feb 2020 11:28) (16 - 18)  SpO2: 100% (20 Feb 2020 11:28) (99% - 100%)    PHYSICAL EXAM:  GENERAL: NAD, well-groomed, well-developed  HEAD:  Atraumatic, Normocephalic  EYES: EOMI, PERRLA, conjunctiva and sclera clear  ENMT: + L side facial droop when smiling, Moist mucous membranes, Good dentition, No lesions  NECK: Supple, No JVD, Normal thyroid  NERVOUS SYSTEM:  Alert & Oriented X3, Good concentration; Motor Strength 5/5 B/L upper and lower extremities; DTRs 2+ intact and symmetric  CHEST/LUNG: Clear to percussion bilaterally; No rales, rhonchi, wheezing, or rubs  HEART: Regular rate and rhythm; No murmurs, rubs, or gallops  ABDOMEN: Soft, Nontender, Nondistended; Bowel sounds present  EXTREMITIES:  2+ Peripheral Pulses, No clubbing, cyanosis, or edema

## 2020-02-20 NOTE — SWALLOW BEDSIDE ASSESSMENT ADULT - SWALLOW EVAL: RECOMMENDED DIET
regular consistency with thin liquid when medically appropriate initiate po diet. regular consistency with thin liquid.

## 2020-02-20 NOTE — ED ADULT NURSE NOTE - CHIEF COMPLAINT QUOTE
BIBA for sudden onset of left side weakness, slured speech an facial droop. last normal at 0930 this am. on Eliquis for afib. fs 299 at triage.

## 2020-02-20 NOTE — H&P ADULT - ATTENDING COMMENTS
89 y/o M w/hx of TIA and AFib on elequis presenting with slurred speech and slouching found to have acute stroke secondary MCA clot on CTA head. Not a candidate for tPA due to taking elequis. Tele neurology did not feel patient needed clot retreival at this time due to improvement in symptoms.     - Admit to ICU for q1hr neuro checks  - Permissive hypertension  - Neuro consult  - Transfer to [facility] if any deterioration in neuro exam

## 2020-02-20 NOTE — OCCUPATIONAL THERAPY INITIAL EVALUATION ADULT - PLANNED THERAPY INTERVENTIONS, OT EVAL
ADL retraining/balance training/bed mobility training/fine motor coordination training/ROM/strengthening/stretching/transfer training

## 2020-02-20 NOTE — H&P ADULT - PROBLEM SELECTOR PLAN 1
-- Admit to ICU for serial neuro checks Q2hr as patient on full dose AC  -- Continue to monitor for changes. Transfer to [place] if decompensates -- Admit to ICU for serial neuro checks Q2hr as patient on full dose AC  -- Continue to monitor for changes. Transfer to [place] if decompensates  -- Follow up A1c, Lipid profile, Echo. TFTs performed in ED.  -- Neuro Consult (Dr. Syed) -- Admit to ICU for serial neuro checks Q2hr as patient on full dose AC  -- Continue to monitor for changes. Transfer to [place] if decompensates  -- Follow up A1c, Lipid profile, Echo. TSH performed, will order rest of TFT panel  -- Swallow eval   -- PT/OT eval  -- Neuro Consult (Dr. Syed)

## 2020-02-20 NOTE — OCCUPATIONAL THERAPY INITIAL EVALUATION ADULT - PERTINENT HX OF CURRENT PROBLEM, REHAB EVAL
Pt admitted from ED on 2/20 due c/o left sided weakness, facial droop, and slurred speech. CTA head: "1. Abrupt occlusion of the proximal right M1 segment. There is, however, contrast enhancement of the distal right MCA branches from collateral flow."

## 2020-02-20 NOTE — H&P ADULT - ASSESSMENT
Patient is a 89yo M with PMHx HTN, DM, TIA, Afib on Eliquis who presents to the ED brought in by family after patient had an episode of slurred speech and slouching after breakfast this morning. In the ED, patient underwent CTA of the head and neck which revealed MCA occlusion. NIHSS 2 as of now. Patient is accepted to the ICU for serial neuro checks and monitoring.     IMPROVE VTE Individual Risk Assessment    RISK                                                                Points    [  ] Previous VTE                                                  3    [  ] Thrombophilia                                               2    [  ] Lower limb paralysis                                      2        (unable to hold up >15 seconds)      [  ] Current Cancer                                              2         (within 6 months)    [  ] Immobilization > 24 hrs                                1    [  ] ICU/CCU stay > 24 hours                              1    [ X ] Age > 60                                                      1    IMPROVE VTE Score _____1____    IMPROVE Score 0-1: Low Risk, No VTE prophylaxis required for most patients, encourage ambulation.   IMPROVE Score 2-3: At risk, pharmacologic VTE prophylaxis is indicated for most patients (in the absence of a contraindication)  IMPROVE Score > or = 4: High Risk, pharmacologic VTE prophylaxis is indicated for most patients (in the absence of a contraindication)

## 2020-02-20 NOTE — ED PROVIDER NOTE - PROGRESS NOTE DETAILS
Tele porfirio Song is notified and [facility] pt is not a tPA candidate due to pt is taking Eliquis last dose is within 24 hours. Ct head reports no acute finding. Pt returned from ct head and remains alert and oriented x 3 NIH score remains one. Pt returned from ct head and remains alert and oriented x 3 NIH score remains two with left facial droop and left leg drip. Dr. Libman tele neuro is notified about pt NIH score and ct angio of neck/head and sts pt is not tPA candidate and no need for transfer for the right mca oclusion now but call him back if pt has worsening symptoms. He sts hydrate pt for " permissive hypertensin keep systolic blood pressure between 180 to 200. ICU PA is notified. Dr. Head icu attending is here eval and admit.
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
## 2020-02-20 NOTE — OCCUPATIONAL THERAPY INITIAL EVALUATION ADULT - ADDITIONAL COMMENTS
Patient reports, with daughter & spouse clarification, that patient lives with wife in house with 2 steps to enter with b/l hand rails. Pt resides on main level with tub/shower combo and standard toilet with rails. Pt was independent with basic ADL and functional mobility with rolling walker prior to admission, however, pt states his wife would occasionally assist him with ADLs.

## 2020-02-20 NOTE — SWALLOW BEDSIDE ASSESSMENT ADULT - SWALLOW EVAL: DIAGNOSIS
pt. presented with oral pharyngeal phases WNL. noted mild lingual residue with hard solid consistency. pt. presented with oral pharyngeal phases WNL. except mild lingual residue with hard solid consistency. no overt signs of aspiration.
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
## 2020-02-20 NOTE — SWALLOW BEDSIDE ASSESSMENT ADULT - COMMENTS
CT Head No Count 12/20/2020 IMPRESSION: No intracranial hemorrhage, mass, shift or acute territorial infarct. Moderate small vessel white matter ischemic changes, similar in appearance to 12/14/2019. Above findings were discussed with Dr. Gómez on 2/20/2020 at 10:59 AM. CT Head No Cont 2/20/2020 IMPRESSION: No intracranial hemorrhage, mass, shift or acute territorial infarct. Moderate small vessel white matter ischemic changes, similar in appearance to 12/14/2019. Above findings were discussed with Dr. Gómez on 2/20/2020 at 10:59 AM.

## 2020-02-20 NOTE — ED ADULT NURSE REASSESSMENT NOTE - NS ED NURSE REASSESS COMMENT FT1
patient A&Ox3 in no acute distress moving all extremities no facial droop clear speech, patient  denied headache denied chest pain denied difficulty breathing  at this time  report was given to Anjelica MOHAN

## 2020-02-20 NOTE — SWALLOW BEDSIDE ASSESSMENT ADULT - SPECIFY REASON(S)
MD order stated failed dysphagia screen MD order stated failed dysphagia screen. KIMBERLEE Dejesus reported pt. failed 90 ml of dysphagia screen.

## 2020-02-21 LAB
ANION GAP SERPL CALC-SCNC: 4 MMOL/L — LOW (ref 5–17)
BUN SERPL-MCNC: 32 MG/DL — HIGH (ref 7–23)
CALCIUM SERPL-MCNC: 8.6 MG/DL — SIGNIFICANT CHANGE UP (ref 8.5–10.1)
CHLORIDE SERPL-SCNC: 105 MMOL/L — SIGNIFICANT CHANGE UP (ref 96–108)
CHOLEST SERPL-MCNC: 92 MG/DL — SIGNIFICANT CHANGE UP (ref 10–199)
CO2 SERPL-SCNC: 27 MMOL/L — SIGNIFICANT CHANGE UP (ref 22–31)
CREAT SERPL-MCNC: 1.32 MG/DL — HIGH (ref 0.5–1.3)
CULTURE RESULTS: SIGNIFICANT CHANGE UP
DIGOXIN SERPL-MCNC: 1.51 NG/ML — SIGNIFICANT CHANGE UP (ref 0.8–2)
GLUCOSE BLDC GLUCOMTR-MCNC: 146 MG/DL — HIGH (ref 70–99)
GLUCOSE BLDC GLUCOMTR-MCNC: 202 MG/DL — HIGH (ref 70–99)
GLUCOSE BLDC GLUCOMTR-MCNC: 205 MG/DL — HIGH (ref 70–99)
GLUCOSE BLDC GLUCOMTR-MCNC: 219 MG/DL — HIGH (ref 70–99)
GLUCOSE SERPL-MCNC: 174 MG/DL — HIGH (ref 70–99)
HBA1C BLD-MCNC: 8.8 % — HIGH (ref 4–5.6)
HCT VFR BLD CALC: 34.6 % — LOW (ref 39–50)
HDLC SERPL-MCNC: 25 MG/DL — LOW
HGB BLD-MCNC: 11.8 G/DL — LOW (ref 13–17)
LIPID PNL WITH DIRECT LDL SERPL: 48 MG/DL — SIGNIFICANT CHANGE UP
MAGNESIUM SERPL-MCNC: 2.1 MG/DL — SIGNIFICANT CHANGE UP (ref 1.6–2.6)
MCHC RBC-ENTMCNC: 31.5 PG — SIGNIFICANT CHANGE UP (ref 27–34)
MCHC RBC-ENTMCNC: 34.1 GM/DL — SIGNIFICANT CHANGE UP (ref 32–36)
MCV RBC AUTO: 92.3 FL — SIGNIFICANT CHANGE UP (ref 80–100)
NRBC # BLD: 0 /100 WBCS — SIGNIFICANT CHANGE UP (ref 0–0)
PHOSPHATE SERPL-MCNC: 3.1 MG/DL — SIGNIFICANT CHANGE UP (ref 2.5–4.5)
PLATELET # BLD AUTO: 196 K/UL — SIGNIFICANT CHANGE UP (ref 150–400)
POTASSIUM SERPL-MCNC: 4.1 MMOL/L — SIGNIFICANT CHANGE UP (ref 3.5–5.3)
POTASSIUM SERPL-SCNC: 4.1 MMOL/L — SIGNIFICANT CHANGE UP (ref 3.5–5.3)
RBC # BLD: 3.75 M/UL — LOW (ref 4.2–5.8)
RBC # FLD: 12.7 % — SIGNIFICANT CHANGE UP (ref 10.3–14.5)
SODIUM SERPL-SCNC: 136 MMOL/L — SIGNIFICANT CHANGE UP (ref 135–145)
SPECIMEN SOURCE: SIGNIFICANT CHANGE UP
T3 SERPL-MCNC: 77 NG/DL — LOW (ref 80–200)
T4 AB SER-ACNC: 7.8 UG/DL — SIGNIFICANT CHANGE UP (ref 4.6–12)
T4 FREE SERPL-MCNC: 1.3 NG/DL — SIGNIFICANT CHANGE UP (ref 0.9–1.8)
TOTAL CHOLESTEROL/HDL RATIO MEASUREMENT: 3.7 RATIO — SIGNIFICANT CHANGE UP (ref 3.4–9.6)
TRIGL SERPL-MCNC: 94 MG/DL — SIGNIFICANT CHANGE UP (ref 10–149)
WBC # BLD: 10.12 K/UL — SIGNIFICANT CHANGE UP (ref 3.8–10.5)
WBC # FLD AUTO: 10.12 K/UL — SIGNIFICANT CHANGE UP (ref 3.8–10.5)

## 2020-02-21 PROCEDURE — 99232 SBSQ HOSP IP/OBS MODERATE 35: CPT

## 2020-02-21 PROCEDURE — 70450 CT HEAD/BRAIN W/O DYE: CPT | Mod: 26

## 2020-02-21 RX ORDER — LEVOTHYROXINE SODIUM 125 MCG
25 TABLET ORAL ONCE
Refills: 0 | Status: COMPLETED | OUTPATIENT
Start: 2020-02-21 | End: 2020-02-21

## 2020-02-21 RX ORDER — APIXABAN 2.5 MG/1
2.5 TABLET, FILM COATED ORAL EVERY 12 HOURS
Refills: 0 | Status: DISCONTINUED | OUTPATIENT
Start: 2020-02-21 | End: 2020-02-23

## 2020-02-21 RX ORDER — ASPIRIN/CALCIUM CARB/MAGNESIUM 324 MG
81 TABLET ORAL DAILY
Refills: 0 | Status: DISCONTINUED | OUTPATIENT
Start: 2020-02-21 | End: 2020-02-23

## 2020-02-21 RX ORDER — SODIUM CHLORIDE 9 MG/ML
1000 INJECTION INTRAMUSCULAR; INTRAVENOUS; SUBCUTANEOUS ONCE
Refills: 0 | Status: COMPLETED | OUTPATIENT
Start: 2020-02-21 | End: 2020-02-21

## 2020-02-21 RX ORDER — ATORVASTATIN CALCIUM 80 MG/1
20 TABLET, FILM COATED ORAL AT BEDTIME
Refills: 0 | Status: DISCONTINUED | OUTPATIENT
Start: 2020-02-21 | End: 2020-02-23

## 2020-02-21 RX ADMIN — SODIUM CHLORIDE 1000 MILLILITER(S): 9 INJECTION INTRAMUSCULAR; INTRAVENOUS; SUBCUTANEOUS at 13:48

## 2020-02-21 RX ADMIN — CHLORHEXIDINE GLUCONATE 1 APPLICATION(S): 213 SOLUTION TOPICAL at 06:19

## 2020-02-21 RX ADMIN — Medication 81 MILLIGRAM(S): at 16:23

## 2020-02-21 RX ADMIN — Medication 2: at 11:15

## 2020-02-21 RX ADMIN — Medication 2: at 16:23

## 2020-02-21 RX ADMIN — APIXABAN 2.5 MILLIGRAM(S): 2.5 TABLET, FILM COATED ORAL at 09:25

## 2020-02-21 RX ADMIN — Medication 25 MICROGRAM(S): at 06:17

## 2020-02-21 RX ADMIN — ATORVASTATIN CALCIUM 20 MILLIGRAM(S): 80 TABLET, FILM COATED ORAL at 21:47

## 2020-02-21 RX ADMIN — APIXABAN 2.5 MILLIGRAM(S): 2.5 TABLET, FILM COATED ORAL at 21:47

## 2020-02-21 NOTE — PROGRESS NOTE ADULT - ASSESSMENT
89 y/o M w/hx of TIA and AFib on elequis presenting with slurred speech and slouching found to have acute stroke secondary MCA clot on CTA head. Not a candidate for tPA due to taking elequis. Tele neurology did not feel patient needed clot retreival at this time due to improvement in symptoms.     - Neuro checks  - Premissive hypertension  - Repeat head CT this morning, restart elequis if no bleed  - Continue statin  - Likely downgrade to medicine 89 y/o M w/hx of TIA and AFib on elequis presenting with slurred speech and slouching found to have acute stroke secondary MCA clot on CTA head. Not a candidate for tPA due to taking elequis. Tele neurology did not feel patient needed clot retreival at this time due to improvement in symptoms.     - Neuro checks  - Premissive hypertension  - Rate control for Afib  - Repeat head CT this morning, restart elequis for Afib if no bleed  - Continue statin  - Likely downgrade to medicine

## 2020-02-21 NOTE — PROGRESS NOTE ADULT - SUBJECTIVE AND OBJECTIVE BOX
HPI:  Patient is a 89yo M with PMHx HTN, DM, TIA, Afib on Eliquis who presents to the ED brought in by family after patient had an episode of slurred speech and slouching after breakfast this morning. In the ED, patient underwent CTA of the head and neck which revealed MCA occlusion with collateral blood flow. On exam the patient is seen lying in bed in NAD. All vitals WNL. Patient states that he feels well. Denies headache, dizziness, slurred speech, chest pain, palpitations, shortness of breath, N/V/D. Patients exam noteable for mild left sided facial droop which the family states is normal from a war injury in the past. Sensations and motor function grossly intact. NIHSS of 2 in the ED. Discussed case with Dr. Graham ([facility] [place]) ([phone]) who would like to keep the patient at [facility] for monitoring and transfer for possible clot retrieval if presentation worsens. (2020 11:47)      24 hr events:    ## ROS:  See above ROS otherwise negative.    ## Vitals  ICU Vital Signs Last 24 Hrs  T(C): 35.6 (2020 07:15), Max: 36.7 (2020 10:33)  T(F): 96 (2020 07:15), Max: 98 (2020 10:33)  HR: 70 (2020 07:00) (60 - 79)  BP: 145/54 (2020 07:00) (119/55 - 161/88)  BP(mean): 77 (2020 07:00) (71 - 104)  ABP: --  ABP(mean): --  RR: 15 (2020 07:00) (0 - 22)  SpO2: 99% (2020 07:00) (95% - 100%)      ## Physical Exam:  Gen: Elderly male lying in bed, NAD  HEENT: NC/AT sclerae anicteric  Resp: No increased WOB, CTAB  CV: S1, S2  Abd: Soft, + BS  Ext: WWP   Neuro: Awake, alert, follows commands, moves all extremities, asymmetric smile (normal for patient per daughter)    ## Vent Data      ## Labs:  Chem:      136  |  105  |  32<H>  ----------------------------<  174<H>  4.1   |  27  |  1.32<H>    Ca    8.6      2020 03:11  Phos  3.1       Mg     2.1         TPro  6.6  /  Alb  2.9<L>  /  TBili  0.5  /  DBili  x   /  AST  18  /  ALT  28  /  AlkPhos  65  0220    LIVER FUNCTIONS - ( 2020 11:27 )  Alb: 2.9 g/dL / Pro: 6.6 gm/dL / ALK PHOS: 65 U/L / ALT: 28 U/L / AST: 18 U/L / GGT: x           CBC:                        11.8   10.12 )-----------( 196      ( 2020 03:11 )             34.6     Coags:  PT/INR - ( 2020 11:27 )   PT: 15.7 sec;   INR: 1.39 ratio         PTT - ( 2020 11:27 )  PTT:34.1 sec    Urinalysis Basic - ( 2020 12:50 )    Color: Yellow / Appearance: Clear / S.010 / pH: x  Gluc: x / Ketone: Negative  / Bili: Negative / Urobili: Negative mg/dL   Blood: x / Protein: 100 mg/dL / Nitrite: Negative   Leuk Esterase: Negative / RBC: 3-5 /HPF / WBC x   Sq Epi: x / Non Sq Epi: Occasional / Bacteria: x        ## Cardiac  CARDIAC MARKERS ( 2020 11:27 )  .044 ng/mL / x     / x     / x     / x            ## Blood Gas      #I/Os  I&O's Detail    2020 07:01  -  2020 07:00  --------------------------------------------------------  IN:    Oral Fluid: 200 mL  Total IN: 200 mL    OUT:    Voided: 300 mL  Total OUT: 300 mL    Total NET: -100 mL          ## Imaging:    ## Medications:  MEDICATIONS  (STANDING):  atorvastatin 40 milliGRAM(s) Oral at bedtime  chlorhexidine 4% Liquid 1 Application(s) Topical <User Schedule>  dextrose 5%. 1000 milliLiter(s) (50 mL/Hr) IV Continuous <Continuous>  dextrose 50% Injectable 12.5 Gram(s) IV Push once  insulin lispro (HumaLOG) corrective regimen sliding scale   SubCutaneous at bedtime  insulin lispro (HumaLOG) corrective regimen sliding scale   SubCutaneous three times a day with meals    MEDICATIONS  (PRN):  dextrose 40% Gel 15 Gram(s) Oral once PRN Blood Glucose LESS THAN 70 milliGRAM(s)/deciliter  glucagon  Injectable 1 milliGRAM(s) IntraMuscular once PRN Glucose LESS THAN 70 milligrams/deciliter HPI:  Patient is a 89yo M with PMHx HTN, DM, TIA, Afib on Eliquis who presents to the ED brought in by family after patient had an episode of slurred speech and slouching after breakfast this morning. In the ED, patient underwent CTA of the head and neck which revealed MCA occlusion with collateral blood flow. On exam the patient is seen lying in bed in NAD. All vitals WNL. Patient states that he feels well. Denies headache, dizziness, slurred speech, chest pain, palpitations, shortness of breath, N/V/D. Patients exam noteable for mild left sided facial droop which the family states is normal from a war injury in the past. Sensations and motor function grossly intact. NIHSS of 2 in the ED. Discussed case with Dr. Graham ([facility] [place]) ([phone]) who would like to keep the patient at [facility] for monitoring and transfer for possible clot retrieval if presentation worsens. (2020 11:47)      24 hr events: No acute events. Feels well this morning. No complaints. No chest pain, dyspnea, nausea, emesis, or diarrhea.     ## ROS:  See above ROS otherwise negative.    ## Vitals  ICU Vital Signs Last 24 Hrs  T(C): 35.6 (2020 07:15), Max: 36.7 (2020 10:33)  T(F): 96 (2020 07:15), Max: 98 (2020 10:33)  HR: 70 (2020 07:00) (60 - 79)  BP: 145/54 (2020 07:00) (119/55 - 161/88)  BP(mean): 77 (2020 07:00) (71 - 104)  ABP: --  ABP(mean): --  RR: 15 (2020 07:00) (0 - 22)  SpO2: 99% (2020 07:00) (95% - 100%)      ## Physical Exam:  Gen: Elderly male lying in bed, NAD  HEENT: NC/AT sclerae anicteric  Resp: No increased WOB, CTAB  CV: S1, S2  Abd: Soft, + BS  Ext: WWP   Neuro: Awake, alert, follows commands, moves all extremities, asymmetric smile (normal for patient per daughter)    ## Vent Data      ## Labs:  Chem:      136  |  105  |  32<H>  ----------------------------<  174<H>  4.1   |  27  |  1.32<H>    Ca    8.6      2020 03:11  Phos  3.1       Mg     2.1         TPro  6.6  /  Alb  2.9<L>  /  TBili  0.5  /  DBili  x   /  AST  18  /  ALT  28  /  AlkPhos  65  20    LIVER FUNCTIONS - ( 2020 11:27 )  Alb: 2.9 g/dL / Pro: 6.6 gm/dL / ALK PHOS: 65 U/L / ALT: 28 U/L / AST: 18 U/L / GGT: x           CBC:                        11.8   10.12 )-----------( 196      ( 2020 03:11 )             34.6     Coags:  PT/INR - ( 2020 11:27 )   PT: 15.7 sec;   INR: 1.39 ratio         PTT - ( 2020 11:27 )  PTT:34.1 sec    Urinalysis Basic - ( 2020 12:50 )    Color: Yellow / Appearance: Clear / S.010 / pH: x  Gluc: x / Ketone: Negative  / Bili: Negative / Urobili: Negative mg/dL   Blood: x / Protein: 100 mg/dL / Nitrite: Negative   Leuk Esterase: Negative / RBC: 3-5 /HPF / WBC x   Sq Epi: x / Non Sq Epi: Occasional / Bacteria: x        ## Cardiac  CARDIAC MARKERS ( 2020 11:27 )  .044 ng/mL / x     / x     / x     / x            ## Blood Gas      #I/Os  I&O's Detail    2020 07:01  -  2020 07:00  --------------------------------------------------------  IN:    Oral Fluid: 200 mL  Total IN: 200 mL    OUT:    Voided: 300 mL  Total OUT: 300 mL    Total NET: -100 mL          ## Imaging:    ## Medications:  MEDICATIONS  (STANDING):  atorvastatin 40 milliGRAM(s) Oral at bedtime  chlorhexidine 4% Liquid 1 Application(s) Topical <User Schedule>  dextrose 5%. 1000 milliLiter(s) (50 mL/Hr) IV Continuous <Continuous>  dextrose 50% Injectable 12.5 Gram(s) IV Push once  insulin lispro (HumaLOG) corrective regimen sliding scale   SubCutaneous at bedtime  insulin lispro (HumaLOG) corrective regimen sliding scale   SubCutaneous three times a day with meals    MEDICATIONS  (PRN):  dextrose 40% Gel 15 Gram(s) Oral once PRN Blood Glucose LESS THAN 70 milliGRAM(s)/deciliter  glucagon  Injectable 1 milliGRAM(s) IntraMuscular once PRN Glucose LESS THAN 70 milligrams/deciliter

## 2020-02-21 NOTE — CHART NOTE - NSCHARTNOTEFT_GEN_A_CORE
HPI  Patient is a 89yo M with PMHx HTN, DM, TIA, Afib on Eliquis who presents to the ED brought in by family after patient had an episode of slurred speech and slouching after breakfast this morning. In the ED, patient underwent CTA of the head and neck which revealed MCA occlusion with collateral blood flow. On exam the patient is seen lying in bed in NAD. All vitals WNL. Patient states that he feels well. Denies headache, dizziness, slurred speech, chest pain, palpitations, shortness of breath, N/V/D. Patients exam notable for mild left sided facial droop which the family states is normal from a war injury in the past. Sensations and motor function grossly intact. NIHSS of 2 in the ED. Discussed case with Dr. Graham (Ne[facility] [place]) ([phone]) who would like to keep the patient at [facility] for monitoring and transfer for possible clot retrieval if presentation worsens.  CT revealed  MCA clot on CTA head. Not a candidate for tPA due to taking Eliquis. Tele neurology did not feel patient needed clot retrieval at this time due to improvement in symptoms.  Pt admitted to ICU for monitoring. Seen by neurology Dr. DANICA Syed.     CT follow up today   1)  lacunar infarcts and chronic ischemic changes noted in both hemispheres as well as within the posterior fossa. No large territorial infarct or hemorrhage identified.  2)  follow-up MR imaging may be considered for further assessment    Patient with PPM in place as unable to have MRI. Patient restarted on Eliquis. Feels well this morning. No complaints. No chest pain, dyspnea, nausea, emesis, or diarrhea. PT ordered passed dysphagia screen diet ordered. Patient seen and examined by ICU attending and stable for transfer to medicine service. HPI  Patient is a 89yo M with PMHx HTN, DM, TIA, Afib on Eliquis who presents to the ED brought in by family after patient had an episode of slurred speech and slouching after breakfast this morning. In the ED, patient underwent CTA of the head and neck which revealed MCA occlusion with collateral blood flow. On exam the patient is seen lying in bed in NAD. All vitals WNL. Patient states that he feels well. Denies headache, dizziness, slurred speech, chest pain, palpitations, shortness of breath, N/V/D. Patients exam notable for mild left sided facial droop which the family states is normal from a war injury in the past. Sensations and motor function grossly intact. NIHSS of 2 in the ED. Discussed case with Dr. Graham (Ne[facility] [place]) ([phone]) who would like to keep the patient at [facility] for monitoring and transfer for possible clot retrieval if presentation worsens.  CT revealed  MCA clot on CTA head. Not a candidate for tPA due to taking Eliquis. Tele neurology did not feel patient needed clot retrieval at this time due to improvement in symptoms.  Pt admitted to ICU for monitoring. Seen by neurology Dr. DANICA Syed.     CT follow up today   1)  lacunar infarcts and chronic ischemic changes noted in both hemispheres as well as within the posterior fossa. No large territorial infarct or hemorrhage identified.  2)  follow-up MR imaging may be considered for further assessment    Patient with PPM in place as unable to have MRI. Patient restarted on Eliquis. Feels well this morning. No complaints. No chest pain, dyspnea, nausea, emesis, or diarrhea. PT ordered passed dysphagia screen diet ordered. HPI  Patient is a 89yo M with PMHx HTN, DM, TIA, Afib on Eliquis who presents to the ED brought in by family after patient had an episode of slurred speech and slouching after breakfast this morning. In the ED, patient underwent CTA of the head and neck which revealed MCA occlusion with collateral blood flow. On exam the patient is seen lying in bed in NAD. All vitals WNL. Patient states that he feels well. Denies headache, dizziness, slurred speech, chest pain, palpitations, shortness of breath, N/V/D. Patients exam notable for mild left sided facial droop which the family states is normal from a war injury in the past. Sensations and motor function grossly intact. NIHSS of 2 in the ED. Discussed case with Dr. Graham (Neuro[facility]urg [place]) ([phone]) who would like to keep the patient at [facility] for monitoring and transfer for possible clot retrieval if presentation worsens.  CT revealed  MCA clot on CTA head. Not a candidate for tPA due to taking Eliquis. Tele neurology did not feel patient needed clot retrieval at this time due to improvement in symptoms.  Pt admitted to ICU for monitoring. Seen by neurology Dr. DANICA Syed. Patients neurologist Dr. Joe Linda was also contacted by Dr. Syed. Patient is to follow up with him next week.      CT follow up 2/21  1)  lacunar infarcts and chronic ischemic changes noted in both hemispheres as well as within the posterior fossa. No large territorial infarct or hemorrhage identified.  2)  follow-up MR imaging may be considered for further assessment    Patient with PPM in place as unable to have MRI. Patients personal cardiologist Dr. Goldberg , As family was concerned that Eliquis was just recently started and previously on coumadin. After extensive family discussion. Advised of all risks and benefits of both anticoagulants, family requested to stay on Eliquis and they will follow up with his cardiologist upon discharge.  Patient restarted on Eliquis. Digoxin and Amlodipine started today for goal blood pressure S 140.    Feels well this morning. No complaints. No chest pain, dyspnea, nausea, emesis, or diarrhea. PT ordered. Passed dysphagia screen diet ordered.  Patient seen and examined by ICU attending and stable for transfer to medicine service.    Report given to Dr. Fonseca, hospitalist service.     Maritza Soto, NP-C  critical care

## 2020-02-21 NOTE — PROGRESS NOTE ADULT - SUBJECTIVE AND OBJECTIVE BOX
Neurology Progress Note    Worked with PT and was sitting in a chair for 2 hours. Slightly increased weakness on the left side.     Neuro Exam: AOx3. Follows commands. No dysarthria. Mild left facial droop. Mild left sided weakness with mild drift on the left. Neglect present.     CT head (2/21/20)- no acute process.   CTA head/neck- right ICA 75% stenosis, right M1 occlusion  LDL- 48  HgbA1c- 8.8  Echo- normal EF    NIHSS- 4    A/P:  Acute right MCA stroke  Right MCA occlusion  Right ICA stenosis  A-fib  HTN  DM2    - continue Eliquis and add aspirin 81mg daily  - Decrease Lipitor to 20mg nightly as LDL is low  - slight increase in weakness probably due to hypotension and/or fatigue. NIHSS is unchanged. Monitor for now. May have to repeat CT head if it does not improve.  - May need rehab  - Family wanted me to speak to his neurologist, Dr. Linda, however he is on vacation. I spoke with the covering neurologist in his group. She recommended he follow up in 2 weeks.   - OK to transfer out of ICU.  - discussed with family at bedside at length.

## 2020-02-21 NOTE — CHART NOTE - NSCHARTNOTEFT_GEN_A_CORE
Called by RN to evaluate patient with CVA and noted increase in facial drop and left arm drift after witting in chair. BP noted to drop to Called by RN to evaluate patient with CVA and noted increase in facial drop and left arm drift after sitting in chair. BP while in bed bp 152/78 MAP 92 in chair 105/68 MAP 76.  patient placed back to bed and bp 112/42. Patient given 1 liter normal saline bolus. blood pressure 151/58    PE:   - awake alert cooperative. following all commands   -no dysarthria. Mild left facial droop.   - Mild left upper extremity weakness NO drift 4/5;  right upper extremity 5/5   - sensation positive and equal.    - will repeat CT tonight with profusion if neuro exam changes.     Will monitor closely, will hold transfer tonight. Discussed with Dr. Head critical care intensivist.

## 2020-02-22 LAB
ANION GAP SERPL CALC-SCNC: 6 MMOL/L — SIGNIFICANT CHANGE UP (ref 5–17)
APTT BLD: 35.3 SEC — SIGNIFICANT CHANGE UP (ref 27.5–36.3)
BUN SERPL-MCNC: 36 MG/DL — HIGH (ref 7–23)
CALCIUM SERPL-MCNC: 8.4 MG/DL — LOW (ref 8.5–10.1)
CHLORIDE SERPL-SCNC: 102 MMOL/L — SIGNIFICANT CHANGE UP (ref 96–108)
CO2 SERPL-SCNC: 27 MMOL/L — SIGNIFICANT CHANGE UP (ref 22–31)
CREAT SERPL-MCNC: 1.48 MG/DL — HIGH (ref 0.5–1.3)
GLUCOSE BLDC GLUCOMTR-MCNC: 165 MG/DL — HIGH (ref 70–99)
GLUCOSE BLDC GLUCOMTR-MCNC: 169 MG/DL — HIGH (ref 70–99)
GLUCOSE BLDC GLUCOMTR-MCNC: 195 MG/DL — HIGH (ref 70–99)
GLUCOSE BLDC GLUCOMTR-MCNC: 213 MG/DL — HIGH (ref 70–99)
GLUCOSE SERPL-MCNC: 187 MG/DL — HIGH (ref 70–99)
HCT VFR BLD CALC: 34.4 % — LOW (ref 39–50)
HGB BLD-MCNC: 11.4 G/DL — LOW (ref 13–17)
INR BLD: 1.27 RATIO — HIGH (ref 0.88–1.16)
MAGNESIUM SERPL-MCNC: 2.2 MG/DL — SIGNIFICANT CHANGE UP (ref 1.6–2.6)
MCHC RBC-ENTMCNC: 30.8 PG — SIGNIFICANT CHANGE UP (ref 27–34)
MCHC RBC-ENTMCNC: 33.1 GM/DL — SIGNIFICANT CHANGE UP (ref 32–36)
MCV RBC AUTO: 93 FL — SIGNIFICANT CHANGE UP (ref 80–100)
NRBC # BLD: 0 /100 WBCS — SIGNIFICANT CHANGE UP (ref 0–0)
PHOSPHATE SERPL-MCNC: 3 MG/DL — SIGNIFICANT CHANGE UP (ref 2.5–4.5)
PLATELET # BLD AUTO: 188 K/UL — SIGNIFICANT CHANGE UP (ref 150–400)
POTASSIUM SERPL-MCNC: 4.4 MMOL/L — SIGNIFICANT CHANGE UP (ref 3.5–5.3)
POTASSIUM SERPL-SCNC: 4.4 MMOL/L — SIGNIFICANT CHANGE UP (ref 3.5–5.3)
PROTHROM AB SERPL-ACNC: 14.3 SEC — HIGH (ref 10–12.9)
RBC # BLD: 3.7 M/UL — LOW (ref 4.2–5.8)
RBC # FLD: 12.7 % — SIGNIFICANT CHANGE UP (ref 10.3–14.5)
SODIUM SERPL-SCNC: 135 MMOL/L — SIGNIFICANT CHANGE UP (ref 135–145)
WBC # BLD: 11.07 K/UL — HIGH (ref 3.8–10.5)
WBC # FLD AUTO: 11.07 K/UL — HIGH (ref 3.8–10.5)

## 2020-02-22 PROCEDURE — 99233 SBSQ HOSP IP/OBS HIGH 50: CPT

## 2020-02-22 RX ORDER — AMLODIPINE BESYLATE 2.5 MG/1
5 TABLET ORAL DAILY
Refills: 0 | Status: DISCONTINUED | OUTPATIENT
Start: 2020-02-22 | End: 2020-02-23

## 2020-02-22 RX ORDER — LEVOTHYROXINE SODIUM 125 MCG
50 TABLET ORAL DAILY
Refills: 0 | Status: DISCONTINUED | OUTPATIENT
Start: 2020-02-22 | End: 2020-02-23

## 2020-02-22 RX ORDER — HYDRALAZINE HCL 50 MG
10 TABLET ORAL ONCE
Refills: 0 | Status: COMPLETED | OUTPATIENT
Start: 2020-02-22 | End: 2020-02-22

## 2020-02-22 RX ORDER — DIGOXIN 250 MCG
0.12 TABLET ORAL DAILY
Refills: 0 | Status: DISCONTINUED | OUTPATIENT
Start: 2020-02-22 | End: 2020-02-23

## 2020-02-22 RX ADMIN — Medication 10 MILLIGRAM(S): at 03:33

## 2020-02-22 RX ADMIN — ATORVASTATIN CALCIUM 20 MILLIGRAM(S): 80 TABLET, FILM COATED ORAL at 21:48

## 2020-02-22 RX ADMIN — Medication 1: at 11:52

## 2020-02-22 RX ADMIN — AMLODIPINE BESYLATE 5 MILLIGRAM(S): 2.5 TABLET ORAL at 21:48

## 2020-02-22 RX ADMIN — Medication 81 MILLIGRAM(S): at 11:52

## 2020-02-22 RX ADMIN — Medication 2: at 17:00

## 2020-02-22 RX ADMIN — Medication 1: at 08:38

## 2020-02-22 RX ADMIN — Medication 50 MICROGRAM(S): at 06:45

## 2020-02-22 RX ADMIN — APIXABAN 2.5 MILLIGRAM(S): 2.5 TABLET, FILM COATED ORAL at 21:48

## 2020-02-22 RX ADMIN — Medication 10 MILLIGRAM(S): at 09:06

## 2020-02-22 RX ADMIN — APIXABAN 2.5 MILLIGRAM(S): 2.5 TABLET, FILM COATED ORAL at 08:39

## 2020-02-22 RX ADMIN — Medication 0.12 MILLIGRAM(S): at 10:08

## 2020-02-22 RX ADMIN — CHLORHEXIDINE GLUCONATE 1 APPLICATION(S): 213 SOLUTION TOPICAL at 08:30

## 2020-02-22 NOTE — DIETITIAN INITIAL EVALUATION ADULT. - PERTINENT MEDS FT
amLODIPine   Tablet  apixaban  aspirin  chewable  atorvastatin  chlorhexidine 4% Liquid  dextrose 40% Gel PRN  dextrose 5%.  dextrose 50% Injectable  glucagon  Injectable PRN  hydrALAZINE Injectable  insulin lispro (HumaLOG) corrective regimen sliding scale  insulin lispro (HumaLOG) corrective regimen sliding scale  levothyroxine

## 2020-02-22 NOTE — PHYSICAL THERAPY INITIAL EVALUATION ADULT - GENERAL OBSERVATIONS, REHAB EVAL
Chart (EMR) reviewed. Received supine c HOB elevated, NAD. +telemetry, +heplock, +slight facial droop noted. Daughter and wife present. Alert. Ox4. Able to follow multistep commands/directions.

## 2020-02-22 NOTE — PROGRESS NOTE ADULT - ASSESSMENT
89 y/o M w/hx of TIA and AFib on elequis presenting with slurred speech and slouching found to have acute stroke secondary MCA clot on CTA head. Not a candidate for tPA due to taking Eliquis Did not go for clot retrieval given the improvement in his sx. Today pt cont to do well.    Neuro  -appreciate Neuro consult, f/u reccs   - Neuro checks  - restarted on eliquis and asa  - monitor hyperglycemia (not tx for DM as outpt  -current hba1c 8.8%)  - also has outpt Neuro appt scheduled as has ICA stenosis along with his MCA clot that will need further monitoring/eval      CVS  - Rate control for Afib on dig at home which will be restarted  - permissive hypertension period over and will restart his home BP meds sequentially as his BP is elevated today  - paced rhythm now  - Continue statin  - cont eliquis      Transfer to medicine

## 2020-02-22 NOTE — PHYSICAL THERAPY INITIAL EVALUATION ADULT - ACTIVE RANGE OF MOTION EXAMINATION, REHAB EVAL
solange. upper extremity Active ROM was WNL (within normal limits)/bilateral lower extremity Active ROM was WNL (within normal limits)

## 2020-02-22 NOTE — DIETITIAN INITIAL EVALUATION ADULT. - OTHER INFO
Pt with s/p CVA spoke to daughter & pt. Pt lives with wife & son. Pt's wife does cooking & food shopping. Pt with good appetite & po intake good PTA. Last BMx 1(2/21). Pt with recent dx DM type 2 no diabetic medications PTA. S/p swallow evaluation 2/20 recommends Regular/thin liquids. Daughter states Endocrinologist PTA prefers pt consuming 30-40gm CHO daily & no diabetic medications. Po intake 100% meals during hospitalization.  Diet hx; sugar free yogurt, blueberries, 1-2 eggs with vegetables,     UBW obtained from previous from hospitalization.     Provided wife, pt & daugther meal planning with plate method handout material, You & diabetes jerrica & ambulatory diabetes wellness information. Pt with s/p CVA spoke to daughter, wife & pt. Pt lives with wife & son. Pt's wife does cooking & food shopping. Pt with good appetite & po intake good PTA. Last BMx 1(2/21). Pt with recent dx DM type 2 no diabetic medications PTA. S/p swallow evaluation 2/20 recommends Regular/thin liquids. Daughter states Endocrinologist PTA prefers pt consuming 30-40gm CHO daily & no diabetic medications. Po intake 100% meals during hospitalization.  Diet hx; sugar free yogurt, blueberries, 1-2 eggs with vegetables,     UBW obtained from previous from hospitalization.     Provided wife, pt & daugther meal planning with plate method handout material, You & diabetes jerrica & ambulatory diabetes wellness information.

## 2020-02-22 NOTE — PHYSICAL THERAPY INITIAL EVALUATION ADULT - LEVEL OF CONSCIOUSNESS, REHAB EVAL
[facility] Cogntive Continuum Level 5: Pt. is able to do complex problem solving but is limited owing to limited flexibility, insight, social behavior, and endurance. Pt. is susceptible to breakdown of behavior outside of a structured setting (e.g., school or work). In stressful situations, shows reserved cognitive functions. Cognitive processing is slow./alert

## 2020-02-22 NOTE — DIETITIAN INITIAL EVALUATION ADULT. - PERTINENT LABORATORY DATA
02-22 Na 135 mmol/L Glu 187 mg/dL<H> K+ 4.4 mmol/L Cr 1.48 mg/dL<H> BUN 36 mg/dL<H> Phos 3.0 mg/dL Alb 2.9(2/20)   PAB n/a   Hgb 11.4 g/dL<L> Hct 34.4 %<L> HgA1C 8.8%(2/21)    Glucose, Serum: 187 mg/dL <H>, WBC=11.07(2/22), 02-22 Na 135 mmol/L Glu 187 mg/dL<H> K+ 4.4 mmol/L Cr 1.48 mg/dL<H> BUN 36 mg/dL<H> Phos 3.0 mg/dL Alb n/a   PAB n/a   Hgb 11.4 g/dL<L> Hct 34.4 %<L> HgA1C n/a    Glucose, Serum: 187 mg/dL <H>02-21 Chol 92 LDL 48 HDL 25<L> Trig 94   24Hr FS:169 mg/dL  219 mg/dL  202 mg/dL  205 mg/dL     24Hr FS:169 mg/dL  219 mg/dL  202 mg/dL  205 mg/dL

## 2020-02-22 NOTE — PHYSICAL THERAPY INITIAL EVALUATION ADULT - GAIT TRAINING, PT EVAL
Pt will improve ambulation to ~ 200 feet Independently using rolling walker within 3-4 weeks. Pt will negotiate ~ 2 steps c B/L rails Independently  within 3-4 weeks.

## 2020-02-22 NOTE — PHYSICAL THERAPY INITIAL EVALUATION ADULT - ADDITIONAL COMMENTS
Patient lives c wife and son in a pvt house c 2 entry steps c B/L rails(reachable), all amenities on the 1st floor. Independent c all ADL's and ambulation with rolling walker.

## 2020-02-22 NOTE — DIETITIAN INITIAL EVALUATION ADULT. - PROBLEM SELECTOR PLAN 1
-- Admit to ICU for serial neuro checks Q2hr as patient on full dose AC  -- Continue to monitor for changes. Transfer to [place] if decompensates  -- Follow up A1c, Lipid profile, Echo. TSH performed, will order rest of TFT panel  -- Swallow eval   -- PT/OT eval  -- Neuro Consult (Dr. Syed)

## 2020-02-22 NOTE — PROGRESS NOTE ADULT - SUBJECTIVE AND OBJECTIVE BOX
Neurology Progress Note    No acute events. Out of CCU.    Neuro Exam: AOx3. Follows commands. No dysarthria. Mild left facial droop. Mild left sided weakness with mild drift on the left. Neglect present.     CT head (2/21/20)- no acute process.   CTA head/neck- right ICA 75% stenosis, right M1 occlusion  LDL- 48  HgbA1c- 8.8  Echo- normal EF    NIHSS- 4    A/P:  Acute right MCA stroke  Right MCA occlusion  Right ICA stenosis  A-fib  HTN  DM2    - continue Eliquis, aspirin 81mg, and Lipitor to 20mg   - D/C planning rehab  - discussed with family at bedside.

## 2020-02-22 NOTE — PHYSICAL THERAPY INITIAL EVALUATION ADULT - CRITERIA FOR SKILLED THERAPEUTIC INTERVENTIONS
impairments found/functional limitations in following categories/risk reduction/prevention/rehab potential/therapy frequency/predicted duration of therapy intervention

## 2020-02-22 NOTE — PROGRESS NOTE ADULT - SUBJECTIVE AND OBJECTIVE BOX
HPI:  Patient is a 89yo M with PMHx HTN, DM, TIA, Afib on Eliquis who presents to the ED brought in by family after patient had an episode of slurred speech and slouching after breakfast this morning. In the ED, patient underwent CTA of the head and neck which revealed MCA occlusion with collateral blood flow. On exam the patient is seen lying in bed in NAD. All vitals WNL. Patient states that he feels well. Denies headache, dizziness, slurred speech, chest pain, palpitations, shortness of breath, N/V/D. Patients exam noteable for mild left sided facial droop which the family states is normal from a war injury in the past. Sensations and motor function grossly intact. NIHSS of 2 in the ED. Discussed case with Dr. Libman ([facility] [place]) ([phone]) who would like to keep the patient at [facility] for monitoring and transfer for possible clot retrieval if presentation worsens. (20 Feb 2020 11:47)      24 hr events: No acute events.      ## ROS:   Denies HA  Denies LH  Denies weakness  Denies visual sx  Denies CP  Denies SOB    ## Vitals   ICU Vital Signs Last 24 Hrs  T(C): 35.6 (22 Feb 2020 07:13), Max: 36.6 (21 Feb 2020 23:34)  T(F): 96 (22 Feb 2020 07:13), Max: 97.9 (21 Feb 2020 23:34)  HR: 60 (22 Feb 2020 11:00) (60 - 91)  BP: 128/51 (22 Feb 2020 11:00) (105/68 - 229/83)  BP(mean): 69 (22 Feb 2020 11:00) (62 - 171)  ABP: --  ABP(mean): --  RR: 12 (22 Feb 2020 11:00) (9 - 24)  SpO2: 96% (22 Feb 2020 11:00) (90% - 100%)        ## Physical Exam:  Gen: Elderly male lying in bed, NAD  HEENT: NC/AT sclerae anicteric  Resp: No increased WOB, CTAB  CV: S1, S2  Abd: Soft, + BS  Ext: no edema  Neuro: Awake, alert, follows commands, 5/5 b/l UE and LE, asymmetric smile (normal for patient per daughter)       ## Labs:                          11.4   11.07 )-----------( 188      ( 22 Feb 2020 03:05 )             34.4   02-22    135  |  102  |  36<H>  ----------------------------<  187<H>  4.4   |  27  |  1.48<H>    Ca    8.4<L>      22 Feb 2020 03:05  Phos  3.0     02-22  Mg     2.2     02-22    TPro  6.6  /  Alb  2.9<L>  /  TBili  0.5  /  DBili  x   /  AST  18  /  ALT  28  /  AlkPhos  65  02-20      ## Medications:   MEDICATIONS  (STANDING):  amLODIPine   Tablet 5 milliGRAM(s) Oral daily  apixaban 2.5 milliGRAM(s) Oral every 12 hours  aspirin  chewable 81 milliGRAM(s) Oral daily  atorvastatin 20 milliGRAM(s) Oral at bedtime  chlorhexidine 4% Liquid 1 Application(s) Topical <User Schedule>  dextrose 5%. 1000 milliLiter(s) (50 mL/Hr) IV Continuous <Continuous>  dextrose 50% Injectable 12.5 Gram(s) IV Push once  digoxin     Tablet 0.125 milliGRAM(s) Oral daily  insulin lispro (HumaLOG) corrective regimen sliding scale   SubCutaneous at bedtime  insulin lispro (HumaLOG) corrective regimen sliding scale   SubCutaneous three times a day with meals  levothyroxine 50 MICROGram(s) Oral daily

## 2020-02-23 ENCOUNTER — TRANSCRIPTION ENCOUNTER (OUTPATIENT)
Age: 85
End: 2020-02-23

## 2020-02-23 VITALS — OXYGEN SATURATION: 97 % | SYSTOLIC BLOOD PRESSURE: 128 MMHG | HEART RATE: 61 BPM | DIASTOLIC BLOOD PRESSURE: 50 MMHG

## 2020-02-23 LAB
GLUCOSE BLDC GLUCOMTR-MCNC: 159 MG/DL — HIGH (ref 70–99)
GLUCOSE BLDC GLUCOMTR-MCNC: 234 MG/DL — HIGH (ref 70–99)

## 2020-02-23 PROCEDURE — 99239 HOSP IP/OBS DSCHRG MGMT >30: CPT

## 2020-02-23 RX ORDER — ASPIRIN/CALCIUM CARB/MAGNESIUM 324 MG
1 TABLET ORAL
Qty: 30 | Refills: 0
Start: 2020-02-23 | End: 2020-03-23

## 2020-02-23 RX ADMIN — Medication 0.12 MILLIGRAM(S): at 06:16

## 2020-02-23 RX ADMIN — Medication 1: at 08:18

## 2020-02-23 RX ADMIN — Medication 81 MILLIGRAM(S): at 11:36

## 2020-02-23 RX ADMIN — Medication 50 MICROGRAM(S): at 06:16

## 2020-02-23 RX ADMIN — Medication 2: at 11:36

## 2020-02-23 RX ADMIN — APIXABAN 2.5 MILLIGRAM(S): 2.5 TABLET, FILM COATED ORAL at 08:17

## 2020-02-23 RX ADMIN — AMLODIPINE BESYLATE 5 MILLIGRAM(S): 2.5 TABLET ORAL at 06:16

## 2020-02-23 NOTE — DISCHARGE NOTE PROVIDER - HOSPITAL COURSE
89yo M with PMHx HTN, DM, TIA, Afib on Eliquis who presents to the ED brought in by family after patient had an episode of slurred speech and slouching after breakfast this morning. In the ED, patient underwent CTA of the head and neck which revealed MCA occlusion with collateral blood flow. On exam the patient is seen lying in bed in NAD. All vitals WNL. Patient states that he feels well. Denies headache, dizziness, slurred speech, chest pain, palpitations, shortness of breath, N/V/D. Patients exam notable for mild left sided facial droop which the family states is normal from a war injury in the past. Sensations and motor function grossly intact. NIHSS of 2 in the ED. 89yo M with PMHx HTN, DM, TIA, Afib on Eliquis who presents to the ED brought in by family after patient had an episode of slurred speech and slouching after breakfast this morning. In the ED, patient underwent CTA of the head and neck which revealed MCA occlusion with collateral blood flow. On exam the patient is seen lying in bed in NAD. All vitals WNL. Patient states that he feels well. Denies headache, dizziness, slurred speech, chest pain, palpitations, shortness of breath, N/V/D. Patients exam notable for mild left sided facial droop which the family states is normal from a war injury in the past. Sensations and motor function grossly intact. NIHSS of 2 in the ED. CT revealed  MCA clot on CTA head. Not a candidate for tPA due to taking Eliquis. Tele neurology did not feel patient needed clot retrieval at this time due to improvement in symptoms.  Pt admitted to ICU for monitoring. Seen by neurology Dr. DANICA Syed. Patients neurologist Dr. Joe Linda was also contacted by Dr. Syed. Patient is to follow up with him next week.      CT follow up 2/21    1)  lacunar infarcts and chronic ischemic changes noted in both hemispheres as well as within the posterior fossa. No large territorial infarct or hemorrhage identified.    2)  follow-up MR imaging may be considered for further assessment        Patient with PPM in place as unable to have MRI. Patients personal cardiologist Dr. Goldberg , As family was concerned that Eliquis was just recently started and previously on coumadin. After extensive family discussion. Advised of all risks and benefits of both anticoagulants, family requested to stay on Eliquis and they will follow up with his cardiologist upon discharge. The patient's slurred speech, numbness and visualc changes have all improved. Acute Rehab was declined by patient and family; they prefer home PT. 89yo M with PMHx HTN, DM, TIA, Afib on Eliquis and hypothyrioidism who presents to the ED brought in by family after patient had an episode of slurred speech and slouching after breakfast this morning. In the ED, patient underwent CTA of the head and neck which revealed MCA occlusion with collateral blood flow. On exam the patient is seen lying in bed in NAD. All vitals WNL. Patient states that he feels well. Denies headache, dizziness, slurred speech, chest pain, palpitations, shortness of breath, N/V/D. Patients exam notable for mild left sided facial droop which the family states is normal from a war injury in the past. Sensations and motor function grossly intact. NIHSS of 2 in the ED. CT revealed  MCA clot on CTA head. Not a candidate for tPA due to taking Eliquis. Tele neurology did not feel patient needed clot retrieval at this time due to improvement in symptoms.  Pt admitted to ICU for monitoring. Seen by neurology Dr. DANICA Syed. Patients neurologist Dr. Joe Linda was also contacted by Dr. Syed. Patient is to follow up with him next week.      CT follow up 2/21    1)  lacunar infarcts and chronic ischemic changes noted in both hemispheres as well as within the posterior fossa. No large territorial infarct or hemorrhage identified.    2)  follow-up MR imaging may be considered for further assessment        Patient with PPM in place as unable to have MRI. Patients personal cardiologist Dr. Goldberg , As family was concerned that Eliquis was just recently started and previously on coumadin. After extensive family discussion. Advised of all risks and benefits of both anticoagulants, family requested to stay on Eliquis and they will follow up with his cardiologist upon discharge. The patient's slurred speech, numbness and visualc changes have all improved. Acute Rehab was declined by patient and family; they prefer home PT.

## 2020-02-23 NOTE — DISCHARGE NOTE PROVIDER - NSDCMRMEDTOKEN_GEN_ALL_CORE_FT
amLODIPine 2.5 mg oral tablet: 1 tab(s) orally once a day  atenolol 50 mg oral tablet: 1 tab(s) orally once a day  digoxin 125 mcg (0.125 mg) oral tablet: tab(s) orally once a day  Eliquis 2.5 mg oral tablet: 1 tab(s) orally 2 times a day  Lipitor 40 mg oral tablet: 1 tab(s) orally once a day  Synthroid 50 mcg (0.05 mg) oral tablet: 1 tab(s) orally once a day amLODIPine 2.5 mg oral tablet: 1 tab(s) orally once a day  aspirin 81 mg oral tablet, chewable: 1 tab(s) orally once a day  atenolol 50 mg oral tablet: 1 tab(s) orally once a day  digoxin 125 mcg (0.125 mg) oral tablet: tab(s) orally once a day  Eliquis 2.5 mg oral tablet: 1 tab(s) orally 2 times a day  Lipitor 40 mg oral tablet: 1 tab(s) orally once a day  Synthroid 50 mcg (0.05 mg) oral tablet: 1 tab(s) orally once a day amLODIPine 2.5 mg oral tablet: 1 tab(s) orally once a day  aspirin 81 mg oral tablet, chewable: 1 tab(s) orally once a day  digoxin 125 mcg (0.125 mg) oral tablet: tab(s) orally once a day  Eliquis 2.5 mg oral tablet: 1 tab(s) orally 2 times a day  Lipitor 40 mg oral tablet: 1 tab(s) orally once a day  Synthroid 50 mcg (0.05 mg) oral tablet: 1 tab(s) orally once a day

## 2020-02-23 NOTE — DISCHARGE NOTE PROVIDER - NSDCCPCAREPLAN_GEN_ALL_CORE_FT
PRINCIPAL DISCHARGE DIAGNOSIS  Diagnosis: CVA (cerebral vascular accident)  Assessment and Plan of Treatment: Right Proximal M1 Segmental thrombosis   lacunar infarcts and chronic ischemic changes noted in both hemispheres   No TPA given due to medication Eliquis   Continue with Eliquis, Aspirin and statin. Home PT (acute rehab defered)  Follow up with PCP Neurologist      SECONDARY DISCHARGE DIAGNOSES  Diagnosis: Afib  Assessment and Plan of Treatment: Continue with Digoxin and Eliquis  Atenolol was held to avoid hypotension    Diagnosis: Carotid stenosis, right  Assessment and Plan of Treatment: 75-85% stenosis   Aspirin, Statin and Eliquis   follow up with neurologist    Diagnosis: Diabetes  Assessment and Plan of Treatment: HgA1c: 8.8%  Diabetic diet.   Follow up with PCP for management.    Diagnosis: HTN (hypertension)  Assessment and Plan of Treatment: Continue with Norvasc.   Atenolol was held to avoid hypotension   continue to monitor blood pressure and follow up with PCP for management. PRINCIPAL DISCHARGE DIAGNOSIS  Diagnosis: CVA (cerebral vascular accident)  Assessment and Plan of Treatment: Right Proximal M1 Segmental thrombosis   lacunar infarcts and chronic ischemic changes noted in both hemispheres   No TPA given due to medication Eliquis   Continue with Eliquis, Aspirin and statin. Home PT (acute rehab defered)  Follow up with PCP Neurologist      SECONDARY DISCHARGE DIAGNOSES  Diagnosis: Afib  Assessment and Plan of Treatment: Continue with Digoxin and Eliquis  Atenolol was held to avoid hypotension    Diagnosis: Carotid stenosis, right  Assessment and Plan of Treatment: 75-85% stenosis   Aspirin, Statin and Eliquis   follow up with neurologist    Diagnosis: Diabetes  Assessment and Plan of Treatment: HgA1c: 8.8%  Diabetic diet.   Follow up with PCP for management.    Diagnosis: HTN (hypertension)  Assessment and Plan of Treatment: Continue with Norvasc.   Atenolol was held to avoid hypotension   continue to monitor blood pressure and follow up with PCP for management.    Diagnosis: Hypothyroidism  Assessment and Plan of Treatment: Continue with synthroid

## 2020-02-23 NOTE — DISCHARGE NOTE NURSING/CASE MANAGEMENT/SOCIAL WORK - PATIENT PORTAL LINK FT
You can access the FollowMyHealth Patient Portal offered by [facility] by registering at the following website: [email or web address]. By joining [employer]’s FollowMyHealth portal, you will also be able to view your health information using other applications (apps) compatible with our system.

## 2020-02-23 NOTE — DISCHARGE NOTE PROVIDER - NSDCQMSTROKERISK_NEU_ALL_CORE
Atrial fibrillation/Diabetes/High blood pressure/History of a stroke or TIA Atrial fibrillation/Carotid stenosis/Diabetes/High blood pressure/History of a stroke or TIA

## 2020-02-23 NOTE — DISCHARGE NOTE PROVIDER - NSDCFUSCHEDAPPT_GEN_ALL_CORE_FT
CRISTO LANCASTER ; 03/10/2020 ; [facility] CRISTO LACNASTER ; 03/10/2020 ; [facility]

## 2020-02-28 ENCOUNTER — APPOINTMENT (OUTPATIENT)
Dept: ELECTROPHYSIOLOGY | Facility: CLINIC | Age: 85
End: 2020-02-28
Payer: MEDICARE

## 2020-02-28 PROCEDURE — 93294 REM INTERROG EVL PM/LDLS PM: CPT

## 2020-02-28 PROCEDURE — 93296 REM INTERROG EVL PM/IDS: CPT

## 2020-03-02 DIAGNOSIS — I63.511 CEREBRAL INFARCTION DUE TO UNSPECIFIED OCCLUSION OR STENOSIS OF RIGHT MIDDLE CEREBRAL ARTERY: ICD-10-CM

## 2020-03-02 DIAGNOSIS — Z95.0 PRESENCE OF CARDIAC PACEMAKER: ICD-10-CM

## 2020-03-02 DIAGNOSIS — I10 ESSENTIAL (PRIMARY) HYPERTENSION: ICD-10-CM

## 2020-03-02 DIAGNOSIS — Z79.01 LONG TERM (CURRENT) USE OF ANTICOAGULANTS: ICD-10-CM

## 2020-03-02 DIAGNOSIS — I48.20 CHRONIC ATRIAL FIBRILLATION, UNSPECIFIED: ICD-10-CM

## 2020-03-02 DIAGNOSIS — E03.9 HYPOTHYROIDISM, UNSPECIFIED: ICD-10-CM

## 2020-03-02 DIAGNOSIS — R29.702 NIHSS SCORE 2: ICD-10-CM

## 2020-03-02 DIAGNOSIS — Z79.899 OTHER LONG TERM (CURRENT) DRUG THERAPY: ICD-10-CM

## 2020-03-02 DIAGNOSIS — I63.231 CEREBRAL INFARCTION DUE TO UNSPECIFIED OCCLUSION OR STENOSIS OF RIGHT CAROTID ARTERIES: ICD-10-CM

## 2020-03-02 DIAGNOSIS — R47.81 SLURRED SPEECH: ICD-10-CM

## 2020-03-02 DIAGNOSIS — I95.9 HYPOTENSION, UNSPECIFIED: ICD-10-CM

## 2020-03-02 DIAGNOSIS — R29.704 NIHSS SCORE 4: ICD-10-CM

## 2020-03-02 DIAGNOSIS — R29.810 FACIAL WEAKNESS: ICD-10-CM

## 2020-03-02 DIAGNOSIS — E11.9 TYPE 2 DIABETES MELLITUS WITHOUT COMPLICATIONS: ICD-10-CM

## 2020-03-02 DIAGNOSIS — Z79.2 LONG TERM (CURRENT) USE OF ANTIBIOTICS: ICD-10-CM

## 2020-03-10 ENCOUNTER — APPOINTMENT (OUTPATIENT)
Dept: HEMATOLOGY ONCOLOGY | Facility: CLINIC | Age: 85
End: 2020-03-10

## 2020-05-05 ENCOUNTER — TRANSCRIPTION ENCOUNTER (OUTPATIENT)
Age: 85
End: 2020-05-05

## 2020-05-26 ENCOUNTER — APPOINTMENT (OUTPATIENT)
Dept: ENDOCRINOLOGY | Facility: CLINIC | Age: 85
End: 2020-05-26

## 2020-05-29 ENCOUNTER — APPOINTMENT (OUTPATIENT)
Dept: ELECTROPHYSIOLOGY | Facility: CLINIC | Age: 85
End: 2020-05-29
Payer: MEDICARE

## 2020-05-29 PROCEDURE — 93296 REM INTERROG EVL PM/IDS: CPT

## 2020-05-29 PROCEDURE — 93294 REM INTERROG EVL PM/LDLS PM: CPT

## 2020-08-28 ENCOUNTER — APPOINTMENT (OUTPATIENT)
Dept: ELECTROPHYSIOLOGY | Facility: CLINIC | Age: 85
End: 2020-08-28
Payer: MEDICARE

## 2020-08-28 PROCEDURE — 93296 REM INTERROG EVL PM/IDS: CPT

## 2020-08-28 PROCEDURE — 93294 REM INTERROG EVL PM/LDLS PM: CPT

## 2020-11-27 ENCOUNTER — APPOINTMENT (OUTPATIENT)
Dept: ELECTROPHYSIOLOGY | Facility: CLINIC | Age: 85
End: 2020-11-27
Payer: MEDICARE

## 2020-11-27 PROCEDURE — 93296 REM INTERROG EVL PM/IDS: CPT

## 2020-11-27 PROCEDURE — 93294 REM INTERROG EVL PM/LDLS PM: CPT

## 2021-02-26 ENCOUNTER — NON-APPOINTMENT (OUTPATIENT)
Age: 86
End: 2021-02-26

## 2021-02-26 ENCOUNTER — APPOINTMENT (OUTPATIENT)
Dept: ELECTROPHYSIOLOGY | Facility: CLINIC | Age: 86
End: 2021-02-26
Payer: MEDICARE

## 2021-02-26 PROCEDURE — 93296 REM INTERROG EVL PM/IDS: CPT

## 2021-02-26 PROCEDURE — 93294 REM INTERROG EVL PM/LDLS PM: CPT

## 2021-05-28 ENCOUNTER — NON-APPOINTMENT (OUTPATIENT)
Age: 86
End: 2021-05-28

## 2021-05-28 ENCOUNTER — APPOINTMENT (OUTPATIENT)
Dept: ELECTROPHYSIOLOGY | Facility: CLINIC | Age: 86
End: 2021-05-28
Payer: MEDICARE

## 2021-05-28 PROCEDURE — 93294 REM INTERROG EVL PM/LDLS PM: CPT

## 2021-05-28 PROCEDURE — 93296 REM INTERROG EVL PM/IDS: CPT

## 2021-08-27 ENCOUNTER — NON-APPOINTMENT (OUTPATIENT)
Age: 86
End: 2021-08-27

## 2021-08-27 ENCOUNTER — APPOINTMENT (OUTPATIENT)
Dept: ELECTROPHYSIOLOGY | Facility: CLINIC | Age: 86
End: 2021-08-27
Payer: MEDICARE

## 2021-08-27 PROCEDURE — 93296 REM INTERROG EVL PM/IDS: CPT

## 2021-08-27 PROCEDURE — 93294 REM INTERROG EVL PM/LDLS PM: CPT

## 2021-11-26 ENCOUNTER — APPOINTMENT (OUTPATIENT)
Dept: ELECTROPHYSIOLOGY | Facility: CLINIC | Age: 86
End: 2021-11-26

## 2021-12-12 ENCOUNTER — FORM ENCOUNTER (OUTPATIENT)
Age: 86
End: 2021-12-12

## 2022-02-25 ENCOUNTER — APPOINTMENT (OUTPATIENT)
Dept: ELECTROPHYSIOLOGY | Facility: CLINIC | Age: 87
End: 2022-02-25

## 2022-07-24 NOTE — CONSULT NOTE ADULT - NIH STROKE SCALE: 5A. MOTOR ARM, LEFT, QM
(1) Drift; limb holds 90 (or 45) degrees, but drifts down before full 10 seconds; does not hit bed or other support Statement Selected

## 2022-08-20 RX ORDER — ATORVASTATIN CALCIUM 80 MG/1
1 TABLET, FILM COATED ORAL
Qty: 0 | Refills: 0 | DISCHARGE

## 2022-08-20 RX ORDER — AMLODIPINE BESYLATE 2.5 MG/1
1 TABLET ORAL
Qty: 0 | Refills: 0 | DISCHARGE

## 2022-08-20 RX ORDER — APIXABAN 2.5 MG/1
1 TABLET, FILM COATED ORAL
Qty: 0 | Refills: 0 | DISCHARGE

## 2022-08-20 RX ORDER — ATENOLOL 25 MG/1
1 TABLET ORAL
Qty: 0 | Refills: 0 | DISCHARGE

## 2022-08-20 RX ORDER — WARFARIN SODIUM 2.5 MG/1
1 TABLET ORAL
Qty: 0 | Refills: 0 | DISCHARGE

## 2022-08-20 RX ORDER — LEVOTHYROXINE SODIUM 125 MCG
1 TABLET ORAL
Qty: 0 | Refills: 0 | DISCHARGE

## 2022-08-20 RX ORDER — DIGOXIN 250 MCG
0 TABLET ORAL
Qty: 0 | Refills: 0 | DISCHARGE

## 2022-08-20 RX ORDER — ATORVASTATIN CALCIUM 80 MG/1
0 TABLET, FILM COATED ORAL
Qty: 0 | Refills: 0 | DISCHARGE

## 2023-07-14 NOTE — H&P ADULT - PROBLEM SELECTOR PLAN 3
-- Hold anti-hypertensives, allow for permissive HTN no greater than 
no history of blood product transfusion

## 2025-06-06 NOTE — ED ADULT NURSE NOTE - NSHOSCREENINGQ1_ED_ALL_ED
Test results    Gwen Rodriguez MA 26 minutes ago (10:19 AM)     SN      Good morning Gwen,      My questions re: new prescription for Fosamax:   1) What dose is Krzysztof President recommending? 2) Does Marques recommendation favor brandname (Fosamax) over generic? 3) Is Bartoloe President recommending oral calcium supplements; if so what dose? 4) has Krzysztof President seen any atypical femur breaks and/or jaw osteonecrosis in patients on Fosamax?            Regarding calling in the prescription, I first want to do a price comparison between my current pharmacy benefit plan ([employer]) and local [facility].  So based on Marques responses to the questions above, Ill have info to do the price comparison. After I do the comparison, I will get back to you and identify which pharmacy to use for the prescription. Thanks!
hide
No